# Patient Record
Sex: FEMALE | Race: WHITE | Employment: OTHER | URBAN - METROPOLITAN AREA
[De-identification: names, ages, dates, MRNs, and addresses within clinical notes are randomized per-mention and may not be internally consistent; named-entity substitution may affect disease eponyms.]

---

## 2017-02-01 ENCOUNTER — OFFICE VISIT (OUTPATIENT)
Dept: OPHTHALMOLOGY | Facility: CLINIC | Age: 82
End: 2017-02-01

## 2017-02-01 DIAGNOSIS — H40.1132 PRIMARY OPEN ANGLE GLAUCOMA OF BOTH EYES, MODERATE STAGE: Primary | ICD-10-CM

## 2017-02-01 DIAGNOSIS — H26.8 PSEUDOEXFOLIATION OF LENS CAPSULE: ICD-10-CM

## 2017-02-01 DIAGNOSIS — H25.10 SENILE NUCLEAR SCLEROSIS, UNSPECIFIED LATERALITY: ICD-10-CM

## 2017-02-01 ASSESSMENT — VISUAL ACUITY
OS_CC+: -1
CORRECTION_TYPE: GLASSES
OD_CC: 20/40
OS_BAT_MED: 40+1
METHOD: SNELLEN - LINEAR
OS_CC: 20/30
OD_BAT_MED: 20/50
OD_CC+: -2

## 2017-02-01 ASSESSMENT — TONOMETRY
OS_IOP_MMHG: 20
OD_IOP_MMHG: 20
IOP_METHOD: APPLANATION

## 2017-02-01 ASSESSMENT — CUP TO DISC RATIO
OD_RATIO: 0.6
OS_RATIO: 0.45

## 2017-02-01 ASSESSMENT — REFRACTION_WEARINGRX
OS_CYLINDER: +0.75
OD_AXIS: 175
SPECS_TYPE: SVL
OS_SPHERE: -4.00
OD_CYLINDER: +0.75
OD_SPHERE: -2.25
OS_AXIS: 171

## 2017-02-01 ASSESSMENT — EXTERNAL EXAM - LEFT EYE: OS_EXAM: NORMAL

## 2017-02-01 ASSESSMENT — EXTERNAL EXAM - RIGHT EYE: OD_EXAM: NORMAL

## 2017-02-01 ASSESSMENT — PACHYMETRY
OD_CT(UM): 530
OS_CT(UM): 532

## 2017-02-01 ASSESSMENT — CONF VISUAL FIELD
OS_NORMAL: 1
OD_NORMAL: 1

## 2017-02-01 ASSESSMENT — SLIT LAMP EXAM - LIDS
COMMENTS: NORMAL
COMMENTS: NORMAL

## 2017-02-01 NOTE — NURSING NOTE
Chief Complaints and History of Present Illnesses   Patient presents with     Cataract Follow Up     Stable VA     HPI    Symptoms:     Floaters (Comment: RE , denies flashes of light)   No flashes         Do you have eye pain now?:  No      Comments:  Stable VA  Timolol 1/2% both eyes in A.M.(7:30a.m.)  Latanoprost at night both eyes(8p.m.)  Pushpa Santos COT 10:54 AM February 1, 2017

## 2017-02-01 NOTE — MR AVS SNAPSHOT
After Visit Summary   2/1/2017    Lacey Bah    MRN: [id number]           Patient Information     Date Of Birth          1935        Visit Information        Provider Department      2/1/2017 10:20 AM Brian Hurtado MD 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      Who to contact     Please call your clinic at [phone] to:    Ask questions about your health    Make or cancel appointments    Discuss your medicines    Learn about your test results    Speak to your doctor   If you have compliments or concerns about an experience at your clinic, or if you wish to file a complaint, please contact [facility] Patient Relations at [phone] or email us at [email or web address]         Additional Information About Your Visit        MyChart Information     [employer] gives you secure access to your electronic health record. If you see a primary care provider, you can also send messages to your care team and make appointments. If you have questions, please call your primary care clinic.  If you do not have a primary care provider, please call [phone] and they will assist you.      [employer] is an electronic gateway that provides easy, online access to your medical records. With [employer], you can request a clinic appointment, read your test results, renew a prescription or communicate with your care team.     To access your existing account, please contact your [facility] or call [phone] for assistance.        Care EveryWhere ID      This is your Care EveryWhere ID. This could be used by other organizations to access your [place] medical records  [id number]         Blood Pressure from Last 3 Encounters:   06/27/16 158/88   04/04/16 172/80   03/18/16 142/92    Weight from Last 3 Encounters:   06/27/16 68.947 kg (152 lb)   04/04/16 69.854 kg (154 lb)   03/18/16 70.308 kg (155 lb)              Today, you had the following     No orders found for display       Primary Care Provider Office Phone # Fax #    Karla Hurt [phone]       [facility] [address] [address]        Thank you!     Thank you for choosing [facility]  for your care. Our goal is always to provide you with excellent care. Hearing back from our patients is one way we can continue to improve our services. Please take a few minutes to complete the written survey that you may receive in the mail after your visit with us. Thank you!             Your Updated Medication List - Protect others around you: Learn how to safely use, store and throw away your medicines at www.disposemymeds.org.          This list is accurate as of: 2/1/17 11:41 AM.  Always use your most recent med list.                   Brand Name Dispense Instructions for use    B COMPLEX PO      1 TABLET DAILY       conjugated estrogens cream    PREMARIN    30 g    Place 0.5 g vaginally twice a week       Cranberry Soft 500 MG Chew          fish oil-omega-3 fatty acids 1000 MG capsule      2 TABLET DAILY       hydrochlorothiazide 25 MG tablet    HYDRODIURIL    90 tablet    Take 1 tablet (25 mg) by mouth daily       latanoprost 0.005 % ophthalmic solution    XALATAN    3 Bottle    Place 1 drop into both eyes At Bedtime RE       lisinopril 10 MG tablet    PRINIVIL/ZESTRIL    90 tablet    Take 1 tablet (10 mg) by mouth daily       MULTI-VITAMIN PO      1 TABLET DAILY       phenazopyridine 100 MG tablet    PYRIDIUM    30 tablet    Take 1-2  tablets (100-200 mg) by mouth 3 times daily as needed for urinary tract discomfort       timolol 0.5 % ophthalmic solution    TIMOPTIC    3 Bottle    Place 1 drop into both eyes daily In the morning       VITAMIN C PO      1 TABLET DAILY       vitamin D 1000 UNITS capsule      1 CAPSULE DAILY       VITAMIN E PO      1 TABLET DAILY

## 2017-02-01 NOTE — PROGRESS NOTES
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 or sooner as needed.    I have confirmed the content of the chief complaint, history of present illness, review of systems, and past medical/surgical history sections and edited the information as needed.    Complete documentation of historical and exam elements from today's encounter can  be found in the full encounter summary report (not reduplicated in this progress  note). I personally obtained the chief complaint(s) and history of present illness. I  confirmed and edited as necessary the review of systems, past medical/surgical  history, family history, social history, and examination findings as documented by  others; and I examined the patient myself. I personally reviewed the relevant tests,  images, and reports as documented above. I formulated and edited as necessary the  assessment and plan and discussed the findings and management plan with the  patient and family.    KYLAH Hurtado M.D.

## 2017-05-16 DIAGNOSIS — I10 HYPERTENSION GOAL BP (BLOOD PRESSURE) < 140/90: ICD-10-CM

## 2017-05-17 NOTE — TELEPHONE ENCOUNTER
lisinopril (PRINIVIL,ZESTRIL) 10 MG tablet,  Last Written Prescription Date: 04/04/2016  Last Fill Quantity: 90, # refills: 4  Last Office Visit with [facility] or [facility] prescribing provider: 06/27/2016       Potassium   Date Value Ref Range Status   03/24/2016 4.3 3.4 - 5.3 mmol/L Final     Creatinine   Date Value Ref Range Status   03/24/2016 0.60 0.52 - 1.04 mg/dL Final     BP Readings from Last 3 Encounters:   06/27/16 158/88   04/04/16 172/80   03/18/16 (!) 142/92       hydrochlorothiazide (HYDRODIURIL) 25 MG tablet      Last Written Prescription Date: 04/04/2016  Last Fill Quantity: 90, # refills: 4  Last Office Visit with [facility], [facility] or [facility] prescribing provider: 06/27/2016       Potassium   Date Value Ref Range Status   03/24/2016 4.3 3.4 - 5.3 mmol/L Final     Creatinine   Date Value Ref Range Status   03/24/2016 0.60 0.52 - 1.04 mg/dL Final     BP Readings from Last 3 Encounters:   06/27/16 158/88   04/04/16 172/80   03/18/16 (!) 142/92     Carmen Richmond MA

## 2017-05-18 RX ORDER — HYDROCHLOROTHIAZIDE 25 MG/1
TABLET ORAL
Qty: 90 TABLET | Refills: 0 | Status: SHIPPED | OUTPATIENT
Start: 2017-05-18 | End: 2017-06-14

## 2017-05-18 RX ORDER — LISINOPRIL 10 MG/1
TABLET ORAL
Qty: 90 TABLET | Refills: 0 | Status: SHIPPED | OUTPATIENT
Start: 2017-05-18 | End: 2017-06-14

## 2017-05-18 NOTE — TELEPHONE ENCOUNTER
Routing refill request to provider for review/approval because:  Labs not current:  K+, Cr,   Last BP was elevated      Jono Neves RN

## 2017-06-05 NOTE — PROGRESS NOTES
"  SUBJECTIVE:                                                    Lacey Bah is a 81 year old female who presents to clinic today for the following health issues:      Medication Followup of ***    Taking Medication as prescribed: {.:567856::\"yes\"}    Side Effects:  {NONEORCHOOSE:051473::\"None\"}    Medication Helping Symptoms:  {.:911607::\"yes\"}       {additional problems for provider to add:851036}    Problem list and histories reviewed & adjusted, as indicated.  Additional history: {NONE - AS DOCUMENTED:163289::\"as documented\"}    {HIST REVIEW/ LINKS 2:820647}    Reviewed and updated as needed this visit by clinical staff       Reviewed and updated as needed this visit by Provider         {PROVIDER CHARTING PREFERENCE:969706}    "

## 2017-06-06 ENCOUNTER — OFFICE VISIT (OUTPATIENT)
Dept: OPHTHALMOLOGY | Facility: CLINIC | Age: 82
End: 2017-06-06

## 2017-06-06 DIAGNOSIS — H26.8 PSEUDOEXFOLIATION OF LENS CAPSULE: ICD-10-CM

## 2017-06-06 DIAGNOSIS — H25.10 SENILE NUCLEAR SCLEROSIS, UNSPECIFIED LATERALITY: ICD-10-CM

## 2017-06-06 DIAGNOSIS — H40.1132 PRIMARY OPEN ANGLE GLAUCOMA OF BOTH EYES, MODERATE STAGE: Primary | ICD-10-CM

## 2017-06-06 DIAGNOSIS — H25.049 POSTERIOR SUBCAPSULAR POLAR SENILE CATARACT, UNSPECIFIED LATERALITY: ICD-10-CM

## 2017-06-06 ASSESSMENT — EXTERNAL EXAM - LEFT EYE: OS_EXAM: NORMAL

## 2017-06-06 ASSESSMENT — TONOMETRY
IOP_METHOD: APPLANATION
OS_IOP_MMHG: 19
OD_IOP_MMHG: 19

## 2017-06-06 ASSESSMENT — VISUAL ACUITY
OS_CC: 20/40
METHOD: SNELLEN - LINEAR
OS_CC+: -1
CORRECTION_TYPE: GLASSES
OS_BAT_MED: 20/50
OD_CC: 20/30
OD_CC+: -2
OD_BAT_MED: 20/50

## 2017-06-06 ASSESSMENT — PACHYMETRY
OS_CT(UM): 532
OD_CT(UM): 530

## 2017-06-06 ASSESSMENT — CUP TO DISC RATIO
OS_RATIO: 0.45
OD_RATIO: 0.6

## 2017-06-06 ASSESSMENT — CONF VISUAL FIELD
OS_NORMAL: 1
OD_NORMAL: 1

## 2017-06-06 ASSESSMENT — SLIT LAMP EXAM - LIDS
COMMENTS: NORMAL
COMMENTS: NORMAL

## 2017-06-06 ASSESSMENT — EXTERNAL EXAM - RIGHT EYE: OD_EXAM: NORMAL

## 2017-06-06 NOTE — MR AVS SNAPSHOT
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      Your next 10 appointments already scheduled     Jun 14, 2017 12:30 PM CDT   [facility] Visit with [facility]   [facility]    [address] [place] [id number] [phone]           Bring a current list of meds and any records pertaining to this visit.  For Physicals, please bring immunization records and any forms needing to be filled out.  Please arrive 10 minutes early to complete paperwork.              Who to contact     Please call your clinic at [phone] to:    Ask questions about your health    Make or cancel appointments    Discuss your medicines    Learn about your test results    Speak to your doctor   If you have compliments or concerns about an experience at your clinic, or if you wish to file a complaint, please contact [facility] Patient Relations at [phone] or email us at [email or web address]         Additional Information About Your Visit        MyChart Information     [employer] gives you secure access to your electronic health record. If you see a primary care provider, you can also send messages to your care team and make appointments. If  you have questions, please call your primary care clinic.  If you do not have a primary care provider, please call [phone] and they will assist you.      [employer] is an electronic gateway that provides easy, online access to your medical records. With [employer], you can request a clinic appointment, read your test results, renew a prescription or communicate with your care team.     To access your existing account, please contact your [facility] or call [phone] for assistance.        Care EveryWhere ID     This is your Care EveryWhere ID. This could be used by other organizations to access your [place] medical records  [id number]         Blood Pressure from Last 3 Encounters:   06/27/16 158/88   04/04/16 172/80   03/18/16 (!) 142/92    Weight from Last 3 Encounters:   06/27/16 68.9 kg (152 lb)   04/04/16 69.9 kg (154 lb)   03/18/16 70.3 kg (155 lb)              Today, you had the following     No orders found for display       Primary Care Provider Office Phone # Fax #    Karla Hurt [phone]       [place] [id number] [facility] [address]        Thank you!     Thank you for choosing [facility]  for your care. Our goal is always to provide you with excellent care. Hearing back from our patients is one way we can continue to improve our services. Please take a few minutes to complete the written survey that you may receive in the mail after your visit with us. Thank you!             Your Updated Medication List - Protect others around you: Learn how to safely use, store and throw away your medicines at www.disposemymeds.org.          This list is accurate as of: 6/6/17  7:48 PM.  Always use your most recent med list.                   Brand Name Dispense Instructions for use    B COMPLEX PO      1 TABLET DAILY       conjugated estrogens cream    PREMARIN    30 g    Place 0.5 g vaginally twice a week        Cranberry Soft 500 MG Chew          fish oil-omega-3 fatty acids 1000 MG capsule      2 TABLET DAILY       hydrochlorothiazide 25 MG tablet    HYDRODIURIL    90 tablet    TAKE ONE TABLET BY MOUTH ONCE DAILY       latanoprost 0.005 % ophthalmic solution    XALATAN    3 Bottle    Place 1 drop into both eyes At Bedtime RE       lisinopril 10 MG tablet    PRINIVIL/ZESTRIL    90 tablet    TAKE ONE TABLET BY MOUTH ONCE DAILY       MULTI-VITAMIN PO      1 TABLET DAILY       phenazopyridine 100 MG tablet    PYRIDIUM    30 tablet    Take 1-2 tablets (100-200 mg) by mouth 3 times daily as needed for urinary tract discomfort       timolol 0.5 % ophthalmic solution    TIMOPTIC    3 Bottle    Place 1 drop into both eyes daily In the morning       VITAMIN C PO      1 TABLET DAILY       vitamin D 1000 UNITS capsule      1 CAPSULE DAILY       VITAMIN E PO      1 TABLET DAILY

## 2017-06-06 NOTE — NURSING NOTE
Chief Complaints and History of Present Illnesses   Patient presents with     Glaucoma Follow Up     4 month return     HPI    Affected eye(s):  Both   Symptoms:     No difficulty with reading   No difficulty with driving   No floaters   No flashes   Dryness (Comment: LE)      Duration:  4 months   Frequency:  Constant       Do you have eye pain now?:  No      Comments:  Timolol QAM to BE / LD @ 8:15 am today  Latanoprost QHS to BE / LD @ 10:30 pm last grace  No refills needed per pt.  Winnie Land COT 11:15 AM 06/06/2017

## 2017-06-07 NOTE — PROGRESS NOTES
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 or sooner as needed.    Complete documentation of historical and exam elements from today's encounter can  be found in the full encounter summary report (not reduplicated in this progress  note). I personally obtained the chief complaint(s) and history of present illness. I  confirmed and edited as necessary the review of systems, past medical/surgical  history, family history, social history, and examination findings as documented by  others; and I examined the patient myself. I personally reviewed the relevant tests,  images, and reports as documented above. I formulated and edited as necessary the  assessment and plan and discussed the findings and management plan with the  patient and family.    KYLAH Hurtado M.D

## 2017-06-14 ENCOUNTER — OFFICE VISIT (OUTPATIENT)
Dept: FAMILY MEDICINE | Facility: CLINIC | Age: 82
End: 2017-06-14
Payer: MEDICARE

## 2017-06-14 VITALS
HEIGHT: 64 IN | HEART RATE: 84 BPM | OXYGEN SATURATION: 99 % | BODY MASS INDEX: 26.46 KG/M2 | SYSTOLIC BLOOD PRESSURE: 162 MMHG | DIASTOLIC BLOOD PRESSURE: 90 MMHG | WEIGHT: 155 LBS | TEMPERATURE: 97.3 F

## 2017-06-14 DIAGNOSIS — D05.10 DCIS (DUCTAL CARCINOMA IN SITU) OF BREAST, UNSPECIFIED LATERALITY: ICD-10-CM

## 2017-06-14 DIAGNOSIS — D12.6 TUBULAR ADENOMA OF COLON: ICD-10-CM

## 2017-06-14 DIAGNOSIS — I10 HYPERTENSION GOAL BP (BLOOD PRESSURE) < 140/90: Primary | ICD-10-CM

## 2017-06-14 DIAGNOSIS — I10 ESSENTIAL HYPERTENSION: ICD-10-CM

## 2017-06-14 DIAGNOSIS — N34.2 ATROPHIC URETHRITIS: ICD-10-CM

## 2017-06-14 DIAGNOSIS — R73.01 IMPAIRED FASTING GLUCOSE: ICD-10-CM

## 2017-06-14 DIAGNOSIS — E78.5 HYPERLIPIDEMIA LDL GOAL <130: ICD-10-CM

## 2017-06-14 LAB
ANION GAP SERPL CALCULATED.3IONS-SCNC: 10 MMOL/L (ref 3–14)
BUN SERPL-MCNC: 12 MG/DL (ref 7–30)
CALCIUM SERPL-MCNC: 10.1 MG/DL (ref 8.5–10.1)
CHLORIDE SERPL-SCNC: 101 MMOL/L (ref 94–109)
CHOLEST SERPL-MCNC: 254 MG/DL
CO2 SERPL-SCNC: 28 MMOL/L (ref 20–32)
CREAT SERPL-MCNC: 0.66 MG/DL (ref 0.52–1.04)
GFR SERPL CREATININE-BSD FRML MDRD: 86 ML/MIN/1.7M2
GLUCOSE SERPL-MCNC: 112 MG/DL (ref 70–99)
HBA1C MFR BLD: 5.4 % (ref 4.3–6)
HDLC SERPL-MCNC: 79 MG/DL
LDLC SERPL CALC-MCNC: 148 MG/DL
NONHDLC SERPL-MCNC: 175 MG/DL
POTASSIUM SERPL-SCNC: 4.2 MMOL/L (ref 3.4–5.3)
SODIUM SERPL-SCNC: 139 MMOL/L (ref 133–144)
TRIGL SERPL-MCNC: 135 MG/DL
TSH SERPL DL<=0.005 MIU/L-ACNC: 0.7 MU/L (ref 0.4–4)

## 2017-06-14 PROCEDURE — G0439 PPPS, SUBSEQ VISIT: HCPCS | Performed by: INTERNAL MEDICINE

## 2017-06-14 PROCEDURE — 80048 BASIC METABOLIC PNL TOTAL CA: CPT | Performed by: INTERNAL MEDICINE

## 2017-06-14 PROCEDURE — 80061 LIPID PANEL: CPT | Performed by: INTERNAL MEDICINE

## 2017-06-14 PROCEDURE — 36415 COLL VENOUS BLD VENIPUNCTURE: CPT | Performed by: INTERNAL MEDICINE

## 2017-06-14 PROCEDURE — 84443 ASSAY THYROID STIM HORMONE: CPT | Performed by: INTERNAL MEDICINE

## 2017-06-14 PROCEDURE — 83036 HEMOGLOBIN GLYCOSYLATED A1C: CPT | Performed by: INTERNAL MEDICINE

## 2017-06-14 RX ORDER — HYDROCHLOROTHIAZIDE 25 MG/1
25 TABLET ORAL DAILY
Qty: 90 TABLET | Refills: 3 | Status: SHIPPED | OUTPATIENT
Start: 2017-06-14 | End: 2018-09-11

## 2017-06-14 RX ORDER — LISINOPRIL 10 MG/1
10 TABLET ORAL DAILY
Qty: 90 TABLET | Refills: 3 | Status: SHIPPED | OUTPATIENT
Start: 2017-06-14 | End: 2018-09-11

## 2017-06-14 ASSESSMENT — PAIN SCALES - GENERAL: PAINLEVEL: NO PAIN (0)

## 2017-06-14 NOTE — PATIENT INSTRUCTIONS
Come in to take your blood pressure checked while on medication.  You can get your tetanus shot at the same time if you like.        Preventive Health Recommendations    Female Ages 65 +    Yearly exam:     See your health care provider every year in order to  o Review health changes.   o Discuss preventive care.    o Review your medicines if your doctor has prescribed any.      You no longer need a yearly Pap test unless you've had an abnormal Pap test in the past 10 years. If you have vaginal symptoms, such as bleeding or discharge, be sure to talk with your provider about a Pap test.      Every 1 to 2 years, have a mammogram.  If you are over 69, talk with your health care provider about whether or not you want to continue having screening mammograms.      Every 10 years, have a colonoscopy. Or, have a yearly FIT test (stool test). These exams will check for colon cancer.       Have a cholesterol test every 5 years, or more often if your doctor advises it.       Have a diabetes test (fasting glucose) every three years. If you are at risk for diabetes, you should have this test more often.       At age 65, have a bone density scan (DEXA) to check for osteoporosis (brittle bone disease).    Shots:    Get a flu shot each year.    Get a tetanus shot every 10 years.    Talk to your doctor about your pneumonia vaccines. There are now two you should receive - Pneumovax (PPSV 23) and Prevnar (PCV 13).    Talk to your doctor about the shingles vaccine.    Talk to your doctor about the hepatitis B vaccine.    Nutrition:     Eat at least 5 servings of fruits and vegetables each day.      Eat whole-grain bread, whole-wheat pasta and brown rice instead of white grains and rice.      Talk to your provider about Calcium and Vitamin D.     Lifestyle    Exercise at least 150 minutes a week (30 minutes a day, 5 days a week). This will help you control your weight and prevent disease.      Limit alcohol to one drink per  day.      No smoking.       Wear sunscreen to prevent skin cancer.       See your dentist twice a year for an exam and cleaning.      See your eye doctor every 1 to 2 years to screen for conditions such as glaucoma, macular degeneration and cataracts.  [facility]    If you have any questions regarding to your visit please contact your care team:     Team Pink:   Clinic Hours Telephone Number   Internal Medicine:  Dr. Karla Mueller, NP       7am-7pm  Monday - Thursday   7am-5pm  Fridays  [phone]  (Appointment scheduling available 24/7)    Questions about your visit?  Team Line  [phone]   Urgent Care - [place] and [place] - 11am-9pm Monday-Friday Saturday-Sunday- 9am-5pm   [place] - 5pm-9pm Monday-Friday Saturday-Sunday- 9am-5pm  [phone] - [place]   [phone] - [place]       What options do I have for visits at the clinic other than the traditional office visit?  To expand how we care for you, many of our providers are utilizing electronic visits (e-visits) and telephone visits, when medically appropriate, for interactions with their patients rather than a visit in the clinic.   We also offer nurse visits for many medical concerns. Just like any other service, we will bill your insurance company for this type of visit based on time spent on the phone with your provider. Not all insurance companies cover these visits. Please check with your medical insurance if this type of visit is covered. You will be responsible for any charges that are not paid by your insurance.      E-visits via [employer]:  generally incur a $35.00 fee.  Telephone visits:  Time spent on the phone: *charged based on time that is spent on the phone in increments of 10 minutes. Estimated cost:   5-10 mins $30.00   11-20 mins. $59.00   21-30 mins. $85.00   Use [employer] (secure email communication and access to your chart) to send your primary care provider a  message or make an appointment. Ask someone on your Team how to sign up for [employer].    For a Price Quote for your services, please call our Consumer Price Line at [phone].    As always, Thank you for trusting us with your health care needs!        JC/MA

## 2017-06-14 NOTE — PROGRESS NOTES
INTERNAL MEDICINE  SUBJECTIVE:                                                            Lacey Bah is a 81 year old female who presents for Preventive Visit.  Are you in the first 12 months of your Medicare Part B coverage?  No    Healthy Habits:    Do you get at least three servings of calcium containing foods daily (dairy, green leafy vegetables, etc.)? yes    Amount of exercise or daily activities, outside of work: 2 day(s) per week    Problems taking medications regularly No    Medication side effects: No    Have you had an eye exam in the past two years? yes    Do you see a dentist twice per year? yes    Do you have sleep apnea, excessive snoring or daytime drowsiness?no    COGNITIVE SCREEN- DECLINE BY PATIENT   1) Repeat 3 items (Banana, Sunrise, Chair)  refused  2) Clock draw: refused  3) 3 item recall: refused  Results: refused    Mini-CogTM Copyright S Preston. Licensed by the author for use in [facility] Brand.net; reprinted with permission (arnulfo@[facility]). All rights reserved.        She is checking her BP at home. It was 141/73 this morning, she didn't take her BP medications today.     Taking medications as prescribed without issue.     Following her last colonoscopy in 2012, it was recommended that she could stop colonoscopy at age 65 as polyps were non-cancerous type. Denies bowel changes, blood in her stool, or black tarry stool.    Patient would like to continue mammograms with her DCIS history. Denies any new breast lumps.     Declines pneumonia and shingles vaccines.    Premarin is working well to resolve her vaginal dryness. After first prescribed she used the cream for three months and her dryness was resolved after three months. It comes and goes but is always relieved with the cream. She has not had issues since.    Patient is following with ophthalmology and will likely need cataract surgery in the near future. Next visit is in 4 months.      Reviewed and updated as needed this visit  by clinical staff  Reviewed and updated as needed this visit by Provider  Social History   Substance Use Topics     Smoking status: Never Smoker     Smokeless tobacco: Never Used     Alcohol use No     none    Today's PHQ-2 Score:   PHQ-2 ( 1999 Pfizer) 6/14/2017 6/27/2016   Q1: Little interest or pleasure in doing things 0 0   Q2: Feeling down, depressed or hopeless 0 0   PHQ-2 Score 0 0   Q1: Little interest or pleasure in doing things - -   Q2: Feeling down, depressed or hopeless - -   PHQ-2 Score - -     Do you feel safe in your environment - Yes    Do you have a Health Care Directive?: No: Advance care planning was reviewed with patient; patient declined at this time.    Current providers sharing in care for this patient include:   Patient Care Team:  Karla Hurt MD as PCP - General      Hearing impairment: No    Ability to successfully perform activities of daily living: Yes, no assistance needed     Fall risk:  Fallen 2 or more times in the past year?: No  Any fall with injury in the past year?: No    Home safety:  none identified  JC/MA    The following health maintenance items are reviewed in Epic and correct as of today:  Health Maintenance   Topic Date Due     TETANUS IMMUNIZATION (SYSTEM ASSIGNED)  01/01/2017     MEDICARE ANNUAL WELLNESS VISIT  04/04/2017     FALL RISK ASSESSMENT  04/04/2017     COLONOSCOPY Q5 YR  07/24/2017     INFLUENZA VACCINE (SYSTEM ASSIGNED)  09/01/2017     PNEUMO 5YR BOOST DUE AFTER AGE 65 (NO IB MSG) (2) 10/29/2017     ADVANCE DIRECTIVE PLANNING Q5 YRS  10/31/2017     DEXA Q3 YR  02/11/2018     PNEUMOCOCCAL (2 of 2 - PPSV23) 06/14/2018     Pneumonia Vaccine:Adults age 65+ who received Pneumovax (PPSV23) at 65 years or older: Should be given PCV13 > 1 year after their most recent PPSV23  Mammogram Screening: Patient over age 75, has elected to continue with mammography screening.  History of abnormal Pap smear: NO - age 65 - see link Cervical Cytology Screening  "Guidelines     ROS:   ROS: 10 point ROS neg other than the symptoms noted above in the HPI.    This document serves as a record of the services and decisions personally performed and made by Karla Hurt MD. It was created on his/her behalf by Holli Fritz, trained medical scribe. The creation of this document is based the provider's statements to the medical scribes.    Scribe Holli Fritz 12:45 PM, June 14, 2017    Problem list, Medication list, Allergies, and Medical/Social/Surgical histories reviewed in [facility] and updated as appropriate.  Labs reviewed in EPIC  OBJECTIVE:                                                            /90  Pulse 84  Temp 97.3  F (36.3  C) (Oral)  Ht 1.626 m (5' 4\")  Wt 70.3 kg (155 lb)  SpO2 99%  Breastfeeding? No  BMI 26.61 kg/m2 Estimated body mass index is 26.61 kg/(m^2) as calculated from the following:    Height as of this encounter: 1.626 m (5' 4\").    Weight as of this encounter: 70.3 kg (155 lb).  EXAM:   GENERAL APPEARANCE: healthy, alert and no distress  EYES: Eyes grossly normal to inspection, PERRL and conjunctivae and sclerae normal  HENT: ear canals and TM's normal, nose and mouth without ulcers or lesions, oropharynx clear and oral mucous membranes moist  NECK: no adenopathy, no asymmetry, masses, or scars and thyroid normal to palpation  RESP: lungs clear to auscultation - no rales, rhonchi or wheezes  BREAST: normal without masses, tenderness or nipple discharge and no palpable axillary masses or adenopathy, multiple sev at the top,   CV: regular rate and rhythm, normal S1 S2, no S3 or S4, no murmur, click or rub, no peripheral edema and peripheral pulses strong  ABDOMEN: soft, nontender, no hepatosplenomegaly, no masses and bowel sounds normal   (female): normal female external genitalia, normal urethral meatus, vaginal mucosal atrophy noted  MS: no musculoskeletal defects are noted and gait is age appropriate without ataxia  SKIN: no suspicious " lesions or rashe multiple veborrheic keratosis on trunk   NEURO: Normal strength and tone, sensory exam grossly normal, mentation intact and speech normal  PSYCH: mentation appears normal and affect normal/bright    ASSESSMENT / PLAN:                                                            1. Hypertension goal BP (blood pressure) < 140/90  Elevated. Pt did not take her BP medication this morning. She will return to pharmacy for a BP check while on her medication. Continues on HCTZ 25 MG and lisinopril 10 MG. Known white coat hypertension   - hydrochlorothiazide (HYDRODIURIL) 25 MG tablet; Take 1 tablet (25 mg) by mouth daily  Dispense: 90 tablet; Refill: 0  - lisinopril (PRINIVIL/ZESTRIL) 10 MG tablet; Take 1 tablet (10 mg) by mouth daily  Dispense: 90 tablet; Refill: 0    2. DCIS (ductal carcinoma in situ) of breast, unspecified laterality  Continues with yearly mammograms.    3. Impaired fasting glucose  Stable. A1C was 5.4 today.    Lab Results   Component Value Date    A1C 5.4 06/14/2017    A1C 5.7 02/06/2015    A1C 5.3 03/02/2012       4. Hyperlipidemia LDL goal <130  Stable.     5. Tubular adenoma of colon  Discussed that it is ok for pt to stop colonoscopies as there is more risk than benefit at her age.    6. Atrophic urethritis  Controlled. The current medical regimen is effective;  continue present plan and medications.  - conjugated estrogens (PREMARIN) cream; Place 0.5 g vaginally twice a week  Dispense: 30 g; Refill: 12      End of Life Planning:  Patient currently has an advanced directive: No.  I have verified the patient's ablity to prepare an advanced directive/make health care decisions.  Literature was provided to assist patient in preparing an advanced directive.    COUNSELING:  Reviewed preventive health counseling, as reflected in patient instructions       Regular exercise       Healthy diet/nutrition       Immunizations  Declined: Pneumococcal and Zoster       Estimated body mass index is  "26.61 kg/(m^2) as calculated from the following:    Height as of this encounter: 1.626 m (5' 4\").    Weight as of this encounter: 70.3 kg (155 lb).   reports that she has never smoked. She has never used smokeless tobacco.    Appropriate preventive services were discussed with this patient, including applicable screening as appropriate for cardiovascular disease, diabetes, osteopenia/osteoporosis, and glaucoma.  As appropriate for age/gender, discussed screening for colorectal cancer, prostate cancer, breast cancer, and cervical cancer. Checklist reviewing preventive services available has been given to the patient.    Reviewed patients plan of care and provided an AVS. The Basic Care Plan (routine screening as documented in Health Maintenance) for Lacey meets the Care Plan requirement. This Care Plan has been established and reviewed with the Patient.    Counseling Resources:  ATP IV Guidelines  Pooled Cohorts Equation Calculator  Breast Cancer Risk Calculator  FRAX Risk Assessment  ICSI Preventive Guidelines  Dietary Guidelines for Americans, 2010  Viyet's MyPlate  ASA Prophylaxis  Lung CA Screening    Patient Instructions     Come in to take your blood pressure checked while on medication.  You can get your tetanus shot at the same time if you like.        Preventive Health Recommendations    Female Ages 65 +    Yearly exam:     See your health care provider every year in order to  o Review health changes.   o Discuss preventive care.    o Review your medicines if your doctor has prescribed any.      You no longer need a yearly Pap test unless you've had an abnormal Pap test in the past 10 years. If you have vaginal symptoms, such as bleeding or discharge, be sure to talk with your provider about a Pap test.      Every 1 to 2 years, have a mammogram.  If you are over 69, talk with your health care provider about whether or not you want to continue having screening mammograms.      Every 10 years, have a " colonoscopy. Or, have a yearly FIT test (stool test). These exams will check for colon cancer.       Have a cholesterol test every 5 years, or more often if your doctor advises it.       Have a diabetes test (fasting glucose) every three years. If you are at risk for diabetes, you should have this test more often.       At age 65, have a bone density scan (DEXA) to check for osteoporosis (brittle bone disease).    Shots:    Get a flu shot each year.    Get a tetanus shot every 10 years.    Talk to your doctor about your pneumonia vaccines. There are now two you should receive - Pneumovax (PPSV 23) and Prevnar (PCV 13).    Talk to your doctor about the shingles vaccine.    Talk to your doctor about the hepatitis B vaccine.    Nutrition:     Eat at least 5 servings of fruits and vegetables each day.      Eat whole-grain bread, whole-wheat pasta and brown rice instead of white grains and rice.      Talk to your provider about Calcium and Vitamin D.     Lifestyle    Exercise at least 150 minutes a week (30 minutes a day, 5 days a week). This will help you control your weight and prevent disease.      Limit alcohol to one drink per day.      No smoking.       Wear sunscreen to prevent skin cancer.       See your dentist twice a year for an exam and cleaning.      See your eye doctor every 1 to 2 years to screen for conditions such as glaucoma, macular degeneration and cataracts.  [facility]    If you have any questions regarding to your visit please contact your care team:     Team Pink:   Clinic Hours Telephone Number   Internal Medicine:  Dr. Karla Mueller NP       7am-7pm  Monday - Thursday   7am-5pm  Fridays  [phone]  (Appointment scheduling available 24/7)    Questions about your visit?  Team Line  [phone]   Urgent Care - [place] - 11am-9pm Monday-Friday Saturday-Sunday- 9am-5pm   [place] - 5pm-9pm Monday-Friday  Saturday-Sunday- 9am-5pm  [phone] - [place]   [phone] - [place]       What options do I have for visits at the clinic other than the traditional office visit?  To expand how we care for you, many of our providers are utilizing electronic visits (e-visits) and telephone visits, when medically appropriate, for interactions with their patients rather than a visit in the clinic.   We also offer nurse visits for many medical concerns. Just like any other service, we will bill your insurance company for this type of visit based on time spent on the phone with your provider. Not all insurance companies cover these visits. Please check with your medical insurance if this type of visit is covered. You will be responsible for any charges that are not paid by your insurance.      E-visits via IntroNet:  generally incur a $35.00 fee.  Telephone visits:  Time spent on the phone: *charged based on time that is spent on the phone in increments of 10 minutes. Estimated cost:   5-10 mins $30.00   11-20 mins. $59.00   21-30 mins. $85.00   Use IntroNet (secure email communication and access to your chart) to send your primary care provider a message or make an appointment. Ask someone on your Team how to sign up for IntroNet.    For a Price Quote for your services, please call our Consumer Price Line at [phone].    As always, Thank you for trusting us with your health care needs!        JC/MA          I spent 19 minutes of time with the patient and >50% of it was in education and counseling regarding preventive health.     The information in this document, created by the medical scribe for me, accurately reflects the services I personally performed and the decisions made by me. I have reviewed and approved this document for accuracy prior to leaving the patient care area.  Karla Hurt MD  12:45 PM, 06/14/17    Karla Hurt MD  [facility]    Start 12:41 PM  End 1:00 PM

## 2017-06-14 NOTE — NURSING NOTE
"Chief Complaint   Patient presents with     Recheck Medication       Initial /90  Pulse 84  Temp 97.3  F (36.3  C) (Oral)  Ht 5' 4\" (1.626 m)  Wt 155 lb (70.3 kg)  SpO2 99%  Breastfeeding? No  BMI 26.61 kg/m2 Estimated body mass index is 26.61 kg/(m^2) as calculated from the following:    Height as of this encounter: 5' 4\" (1.626 m).    Weight as of this encounter: 155 lb (70.3 kg).  Medication Reconciliation: complete   JC/MA      "

## 2017-06-14 NOTE — MR AVS SNAPSHOT
After Visit Summary   6/14/2017    Lacey Bah    MRN: [id number]           Patient Information     Date Of Birth          1935        Visit Information        Provider Department      6/14/2017 12:30 PM Karla Hurt MD [facility]        Today's Diagnoses     Hypertension goal BP (blood pressure) < 140/90    -  1    DCIS (ductal carcinoma in situ) of breast, unspecified laterality        Essential hypertension        Impaired fasting glucose        Hyperlipidemia LDL goal <130        Tubular adenoma of colon        Atrophic urethritis          Care Instructions    -Come in to take your blood pressure checked while on medication.  You can get your tetanus shot at the same time if you like.        Preventive Health Recommendations    Female Ages 65 +    Yearly exam:     See your health care provider every year in order to  o Review health changes.   o Discuss preventive care.    o Review your medicines if your doctor has prescribed any.      You no longer need a yearly Pap test unless you've had an abnormal Pap test in the past 10 years. If you have vaginal symptoms, such as bleeding or discharge, be sure to talk with your provider about a Pap test.      Every 1 to 2 years, have a mammogram.  If you are over 69, talk with your health care provider about whether or not you want to continue having screening mammograms.      Every 10 years, have a colonoscopy. Or, have a yearly FIT test (stool test). These exams will check for colon cancer.       Have a cholesterol test every 5 years, or more often if your doctor advises it.       Have a diabetes test (fasting glucose) every three years. If you are at risk for diabetes, you should have this test more often.       At age 65, have a bone density scan (DEXA) to check for osteoporosis (brittle bone disease).    Shots:    Get a flu shot each year.    Get a tetanus shot every 10 years.    Talk to your doctor about your pneumonia  vaccines. There are now two you should receive - Pneumovax (PPSV 23) and Prevnar (PCV 13).    Talk to your doctor about the shingles vaccine.    Talk to your doctor about the hepatitis B vaccine.    Nutrition:     Eat at least 5 servings of fruits and vegetables each day.      Eat whole-grain bread, whole-wheat pasta and brown rice instead of white grains and rice.      Talk to your provider about Calcium and Vitamin D.     Lifestyle    Exercise at least 150 minutes a week (30 minutes a day, 5 days a week). This will help you control your weight and prevent disease.      Limit alcohol to one drink per day.      No smoking.       Wear sunscreen to prevent skin cancer.       See your dentist twice a year for an exam and cleaning.      See your eye doctor every 1 to 2 years to screen for conditions such as glaucoma, macular degeneration and cataracts.  [facility]    If you have any questions regarding to your visit please contact your care team:     Team Pink:   Clinic Hours Telephone Number   Internal Medicine:  Dr. Karla Mueller NP       7am-7pm  Monday - Thursday   7am-5pm  Fridays  [phone]  (Appointment scheduling available 24/7)    Questions about your visit?  Team Line  [phone]   Urgent Care - [place] - 11am-9pm Monday-Friday Saturday-Sunday- 9am-5pm   [place] - 5pm-9pm Monday-Friday Saturday-Sunday- 9am-5pm  [phone] - [place]   [phone] - [place]       What options do I have for visits at the clinic other than the traditional office visit?  To expand how we care for you, many of our providers are utilizing electronic visits (e-visits) and telephone visits, when medically appropriate, for interactions with their patients rather than a visit in the clinic.   We also offer nurse visits for many medical concerns. Just like any other service, we will bill your insurance company for this type of visit based on  time spent on the phone with your provider. Not all insurance companies cover these visits. Please check with your medical insurance if this type of visit is covered. You will be responsible for any charges that are not paid by your insurance.      E-visits via Tealethart:  generally incur a $35.00 fee.  Telephone visits:  Time spent on the phone: *charged based on time that is spent on the phone in increments of 10 minutes. Estimated cost:   5-10 mins $30.00   11-20 mins. $59.00   21-30 mins. $85.00   Use [employer] (secure email communication and access to your chart) to send your primary care provider a message or make an appointment. Ask someone on your Team how to sign up for [employer].    For a Price Quote for your services, please call our 8020select Line at [phone].    As always, Thank you for trusting us with your health care needs!        JC/MA              Follow-ups after your visit        Who to contact     If you have questions or need follow up information about today's clinic visit or your schedule please contact [facility] directly at [phone].  Normal or non-critical lab and imaging results will be communicated to you by Tealethart, letter or phone within 4 business days after the clinic has received the results. If you do not hear from us within 7 days, please contact the clinic through SynapticMasht or phone. If you have a critical or abnormal lab result, we will notify you by phone as soon as possible.  Submit refill requests through [employer] or call your pharmacy and they will forward the refill request to us. Please allow 3 business days for your refill to be completed.          Additional Information About Your Visit        Tealethart Information     [employer] gives you secure access to your electronic health record. If you see a primary care provider, you can also send messages to your care team and make appointments. If you have questions, please call your primary care clinic.  " If you do not have a primary care provider, please call [phone] and they will assist you.        Care EveryWhere ID     This is your Care EveryWhere ID. This could be used by other organizations to access your [place] medical records  [id number]        Your Vitals Were     Pulse Temperature Height Pulse Oximetry Breastfeeding? BMI (Body Mass Index)    84 97.3  F (36.3  C) (Oral) 5' 4\" (1.626 m) 99% No 26.61 kg/m2       Blood Pressure from Last 3 Encounters:   06/14/17 162/90   06/27/16 158/88   04/04/16 172/80    Weight from Last 3 Encounters:   06/14/17 155 lb (70.3 kg)   06/27/16 152 lb (68.9 kg)   04/04/16 154 lb (69.9 kg)              We Performed the Following     Basic metabolic panel     Hemoglobin A1c     Lipid panel reflex to direct LDL     TSH with free T4 reflex          Today's Medication Changes          These changes are accurate as of: 6/14/17  1:00 PM.  If you have any questions, ask your nurse or doctor.               These medicines have changed or have updated prescriptions.        Dose/Directions    hydrochlorothiazide 25 MG tablet   Commonly known as:  HYDRODIURIL   This may have changed:  See the new instructions.   Used for:  Hypertension goal BP (blood pressure) < 140/90   Changed by:  Karla Hurt MD        Dose:  25 mg   Take 1 tablet (25 mg) by mouth daily   Quantity:  90 tablet   Refills:  3       lisinopril 10 MG tablet   Commonly known as:  PRINIVIL/ZESTRIL   This may have changed:  See the new instructions.   Used for:  Hypertension goal BP (blood pressure) < 140/90   Changed by:  Karla Hurt MD        Dose:  10 mg   Take 1 tablet (10 mg) by mouth daily   Quantity:  90 tablet   Refills:  3            Where to get your medicines      These medications were sent to [facility] [address] [place], [place] - [address] [facility]  [address] [facility] [address]     Phone:  [phone]     hydrochlorothiazide 25 MG tablet    lisinopril 10 MG tablet       "          Primary Care Provider Office Phone # Fax #    Karla Hurt [phone]       [address]  [place] [address]        Thank you!     Thank you for choosing [facility]  for your care. Our goal is always to provide you with excellent care. Hearing back from our patients is one way we can continue to improve our services. Please take a few minutes to complete the written survey that you may receive in the mail after your visit with us. Thank you!             Your Updated Medication List - Protect others around you: Learn how to safely use, store and throw away your medicines at www.disposemymeds.org.          This list is accurate as of: 6/14/17  1:00 PM.  Always use your most recent med list.                   Brand Name Dispense Instructions for use    B COMPLEX PO      1 TABLET DAILY       conjugated estrogens cream    PREMARIN    30 g    Place 0.5 g vaginally twice a week       Cranberry Soft 500 MG Chew          fish oil-omega-3 fatty acids 1000 MG capsule      2 TABLET DAILY       hydrochlorothiazide 25 MG tablet    HYDRODIURIL    90 tablet    Take 1 tablet (25 mg) by mouth daily       latanoprost 0.005 % ophthalmic solution    XALATAN    3 Bottle    Place 1 drop into both eyes At Bedtime RE       lisinopril 10 MG tablet    PRINIVIL/ZESTRIL    90 tablet    Take 1 tablet (10 mg) by mouth daily       MULTI-VITAMIN PO      1 TABLET DAILY       phenazopyridine 100 MG tablet    PYRIDIUM    30 tablet    Take 1-2 tablets (100-200 mg) by mouth 3 times daily as needed for urinary tract discomfort       timolol 0.5 % ophthalmic solution    TIMOPTIC    3 Bottle    Place 1 drop into both eyes daily In the morning       VITAMIN C PO      1 TABLET DAILY       vitamin D 1000 UNITS capsule      1 CAPSULE DAILY       VITAMIN E PO      1 TABLET DAILY

## 2017-06-15 NOTE — PROGRESS NOTES
Cholesterol has increased.  Normal electrolytes. Normal kidney function. Blood sugar is slightly elevated but not in the range of diabetes.  Normal thyroid.

## 2017-06-28 ENCOUNTER — TRANSFERRED RECORDS (OUTPATIENT)
Dept: HEALTH INFORMATION MANAGEMENT | Facility: CLINIC | Age: 82
End: 2017-06-28

## 2017-07-25 ENCOUNTER — TELEPHONE (OUTPATIENT)
Dept: INTERNAL MEDICINE | Facility: CLINIC | Age: 82
End: 2017-07-25

## 2017-07-25 NOTE — TELEPHONE ENCOUNTER
Patient last seen on 6/14/17.  JC/MA    Panel Management Review      Patient has the following on her problem list: None      Composite cancer screening  Chart review shows that this patient is due/due soon for the following None  Summary:    Patient is due/failing the following:   none    Action needed:   none    Type of outreach:    none    Questions for provider review:    None                                                                                                                                    NINA       Chart routed to.

## 2017-10-01 ENCOUNTER — HEALTH MAINTENANCE LETTER (OUTPATIENT)
Age: 82
End: 2017-10-01

## 2017-10-27 ENCOUNTER — OFFICE VISIT (OUTPATIENT)
Dept: OPHTHALMOLOGY | Facility: CLINIC | Age: 82
End: 2017-10-27

## 2017-10-27 DIAGNOSIS — H26.8 PSEUDOEXFOLIATION OF LENS CAPSULE: ICD-10-CM

## 2017-10-27 DIAGNOSIS — H25.049 POSTERIOR SUBCAPSULAR POLAR SENILE CATARACT, UNSPECIFIED LATERALITY: ICD-10-CM

## 2017-10-27 DIAGNOSIS — H40.1132 PRIMARY OPEN ANGLE GLAUCOMA OF BOTH EYES, MODERATE STAGE: Primary | ICD-10-CM

## 2017-10-27 DIAGNOSIS — H25.10 SENILE NUCLEAR SCLEROSIS, UNSPECIFIED LATERALITY: ICD-10-CM

## 2017-10-27 ASSESSMENT — SLIT LAMP EXAM - LIDS
COMMENTS: NORMAL
COMMENTS: NORMAL

## 2017-10-27 ASSESSMENT — CUP TO DISC RATIO
OS_RATIO: 0.45
OD_RATIO: 0.55

## 2017-10-27 ASSESSMENT — EXTERNAL EXAM - RIGHT EYE: OD_EXAM: NORMAL

## 2017-10-27 ASSESSMENT — TONOMETRY
OD_IOP_MMHG: 21
IOP_METHOD: APPLANATION
OS_IOP_MMHG: 20

## 2017-10-27 ASSESSMENT — VISUAL ACUITY
OS_CC+: -2
OS_CC: 20/40
OD_CC+: -2
METHOD: SNELLEN - LINEAR
OD_CC: 20/30
OD_BAT_MED: 20/40-3
CORRECTION_TYPE: GLASSES

## 2017-10-27 ASSESSMENT — CONF VISUAL FIELD
OD_NORMAL: 1
OS_NORMAL: 1

## 2017-10-27 ASSESSMENT — REFRACTION_MANIFEST
OS_AXIS: 171
OS_CYLINDER: +0.75
OD_SPHERE: -2.25
OD_AXIS: 175
OS_SPHERE: -4.00
OD_CYLINDER: +1.00

## 2017-10-27 ASSESSMENT — REFRACTION_WEARINGRX
OD_AXIS: 175
OS_CYLINDER: +0.75
OD_CYLINDER: +0.75
OS_SPHERE: -4.00
OS_AXIS: 171
OD_SPHERE: -2.25

## 2017-10-27 ASSESSMENT — PACHYMETRY
OS_CT(UM): 532
OD_CT(UM): 530

## 2017-10-27 ASSESSMENT — EXTERNAL EXAM - LEFT EYE: OS_EXAM: NORMAL

## 2017-10-27 NOTE — NURSING NOTE
Chief Complaints and History of Present Illnesses   Patient presents with     Glaucoma Follow Up     HPI    Last Eye Exam:  6/6/17   Affected eye(s):  Both   Symptoms:        Duration:  4 months   Frequency:  Constant       Do you have eye pain now?:  No      Comments:  Pt here for 4 month follow up. Vision is slightly changed? Would like to review options with surgery and MD today. No problems using current drops and last drop used this morning. BE feeling good and comfortable. PANCHITO SNOW, COA 10:25 AM 10/27/2017

## 2017-10-27 NOTE — MR AVS SNAPSHOT
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      Your next 10 appointments already scheduled     Feb 28, 2018 10:20 AM CST   Return Visit with Brian Hurtado MD   [facility])    [facility]  [address] [address] [phone]              Who to contact     Please call your clinic at [phone] to:    Ask questions about your health    Make or cancel appointments    Discuss your medicines    Learn about your test results    Speak to your doctor   If you have compliments or concerns about an experience at your clinic, or if you wish to file a complaint, please contact [facility] Patient Relations at [phone] or email us at [email or web address]         Additional Information About Your Visit        MyChart Information     Gremlnt gives you secure access to your electronic health record. If you see a primary care provider, you can also send messages to your care team and make appointments. If you have questions, please call your primary care clinic.  If you do not have a primary care provider, please call [phone] and they will assist you.       [employer] Solar is an electronic gateway that provides easy, online access to your medical records. With [employer], you can request a clinic appointment, read your test results, renew a prescription or communicate with your care team.     To access your existing account, please contact your [facility] or call [phone] for assistance.        Care EveryWhere ID     This is your Care EveryWhere ID. This could be used by other organizations to access your [place] medical records  [id number]         Blood Pressure from Last 3 Encounters:   06/14/17 162/90   06/27/16 158/88   04/04/16 172/80    Weight from Last 3 Encounters:   06/14/17 70.3 kg (155 lb)   06/27/16 68.9 kg (152 lb)   04/04/16 69.9 kg (154 lb)              We Performed the Following     OCT Optic Nerve RNFL Spectralis OU (both eyes)        Primary Care Provider Office Phone # Fax #    Karla Hurt [phone]       [address] [facility] [address]        Equal Access to Services     [facility]: Hadii aad ku hadasho Soomaali, waaxda luqadaha, qaybta kaalmada adeegyada, waxay deandrein hayrema shields . So [facility] [phone].    ATENCIÓN: Si habla español, tiene a pena disposición servicios gratuitos de asistencia lingüística. Llame al [phone].    We comply with applicable federal civil rights laws and [place] laws. We do not discriminate on the basis of race, color, national origin, age, disability, sex, sexual orientation, or gender identity.            Thank you!     Thank you for choosing [facility]  for your care. Our goal is always to provide you with excellent care. Hearing back from our patients is one way we can continue to improve our services. Please take a few minutes to complete the written survey that you may receive in the mail after your visit with us. Thank you!             Your Updated Medication List - Protect others around you: Learn how  to safely use, store and throw away your medicines at www.disposemymeds.org.          This list is accurate as of: 10/27/17  9:30 PM.  Always use your most recent med list.                   Brand Name Dispense Instructions for use Diagnosis    B COMPLEX PO      1 TABLET DAILY        conjugated estrogens cream    PREMARIN    30 g    Place 0.5 g vaginally twice a week    Atrophic urethritis       Cranberry Soft 500 MG Chew           fish oil-omega-3 fatty acids 1000 MG capsule      2 TABLET DAILY        hydrochlorothiazide 25 MG tablet    HYDRODIURIL    90 tablet    Take 1 tablet (25 mg) by mouth daily    Hypertension goal BP (blood pressure) < 140/90       latanoprost 0.005 % ophthalmic solution    XALATAN    3 Bottle    Place 1 drop into both eyes At Bedtime RE    Primary open angle glaucoma of both eyes, moderate stage       lisinopril 10 MG tablet    PRINIVIL/ZESTRIL    90 tablet    Take 1 tablet (10 mg) by mouth daily    Hypertension goal BP (blood pressure) < 140/90       MULTI-VITAMIN PO      1 TABLET DAILY        phenazopyridine 100 MG tablet    PYRIDIUM    30 tablet    Take 1-2 tablets (100-200 mg) by mouth 3 times daily as needed for urinary tract discomfort    Abnormal urinalysis, Atrophic urethritis       timolol 0.5 % ophthalmic solution    TIMOPTIC    3 Bottle    Place 1 drop into both eyes daily In the morning    Primary open angle glaucoma of both eyes, moderate stage       VITAMIN C PO      1 TABLET DAILY        vitamin D 1000 UNITS capsule      1 CAPSULE DAILY        VITAMIN E PO      1 TABLET DAILY

## 2017-10-28 NOTE — PROGRESS NOTES
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 or sooner as needed.    Complete documentation of historical and exam elements from today's encounter can  be found in the full encounter summary report (not reduplicated in this progress  note). I personally obtained the chief complaint(s) and history of present illness. I  confirmed and edited as necessary the review of systems, past medical/surgical  history, family history, social history, and examination findings as documented by  others; and I examined the patient myself. I personally reviewed the relevant tests,  images, and reports as documented above. I formulated and edited as necessary the  assessment and plan and discussed the findings and management plan with the  patient and family.    KYLAH Hurtado M.D

## 2017-11-28 ENCOUNTER — OFFICE VISIT (OUTPATIENT)
Dept: URGENT CARE | Facility: RETAIL CLINIC | Age: 82
End: 2017-11-28

## 2017-11-28 VITALS — DIASTOLIC BLOOD PRESSURE: 80 MMHG | HEART RATE: 85 BPM | TEMPERATURE: 98.4 F | SYSTOLIC BLOOD PRESSURE: 174 MMHG

## 2017-11-28 DIAGNOSIS — R21 RASH OF FACE: Primary | ICD-10-CM

## 2017-11-28 NOTE — NURSING NOTE
"Chief Complaint   Patient presents with     Derm Problem     rash on forehead x 2 days, rash itches sometimes, at the beginning it hurt at that time but not much pain now, mostly burning sensation, no fevers       Initial /80 (BP Location: Left arm)  Pulse 85  Temp 98.4  F (36.9  C) (Temporal) Estimated body mass index is 26.61 kg/(m^2) as calculated from the following:    Height as of 6/14/17: 5' 4\" (1.626 m).    Weight as of 6/14/17: 155 lb (70.3 kg).  Medication Reconciliation: complete    "

## 2017-11-28 NOTE — MR AVS SNAPSHOT
After Visit Summary   11/28/2017    Lacey Bah    MRN: [id number]           Patient Information     Date Of Birth          1935        Visit Information        Provider Department      11/28/2017 12:30 PM Nuria Coto NP [facility]        Today's Diagnoses     Rash of face    -  1      Care Instructions    Referred to urgent care this afternoon.          Follow-ups after your visit        Your next 10 appointments already scheduled     Feb 28, 2018 10:20 AM CST   Return Visit with Brian Hurtado MD   [facility])    [facility]  [address]  [facility] [address] [phone]              Who to contact     You can reach your care team any time of the day by calling [phone].  Notification of test results:  If you have an abnormal lab result, we will notify you by phone as soon as possible.         Additional Information About Your Visit        MyChart Information     LoopUp gives you secure access to your electronic health record. If you see a primary care provider, you can also send messages to your care team and make appointments. If you have questions, please call your primary care clinic.  If you do not have a primary care provider, please call [phone] and they will assist you.        Care EveryWhere ID     This is your Care EveryWhere ID. This could be used by other organizations to access your [place] medical records  [id number]        Your Vitals Were     Pulse Temperature                85 98.4  F (36.9  C) (Temporal)           Blood Pressure from Last 3 Encounters:   11/28/17 174/80   06/14/17 162/90   06/27/16 158/88    Weight from Last 3 Encounters:   06/14/17 155 lb (70.3 kg)   06/27/16 152 lb (68.9 kg)   04/04/16 154 lb (69.9 kg)              Today, you had the following     No orders found for display       Primary Care Provider Office  Phone # Fax #    Karla Meli Hurt [phone]       [address] [facility]  [place] [address]        Equal Access to Services     YONATHAN HOANGSUSHIL : Bella imer cortes rajendra Ceja, wanaada luqadaha, qamemota kadamion vergara, marty alex laDezrema callie. So [facility] [phone].    ATENCIÓN: Si habla español, tiene a pena disposición servicios gratuitos de asistencia lingüística. Llame al [phone].    We comply with applicable federal civil rights laws and [place] laws. We do not discriminate on the basis of race, color, national origin, age, disability, sex, sexual orientation, or gender identity.            Thank you!     Thank you for choosing [facility]  for your care. Our goal is always to provide you with excellent care. Hearing back from our patients is one way we can continue to improve our services. Please take a few minutes to complete the written survey that you may receive in the mail after your visit with us. Thank you!             Your Updated Medication List - Protect others around you: Learn how to safely use, store and throw away your medicines at www.disposemymeds.org.          This list is accurate as of: 11/28/17 12:55 PM.  Always use your most recent med list.                   Brand Name Dispense Instructions for use Diagnosis    B COMPLEX PO      1 TABLET DAILY        conjugated estrogens cream    PREMARIN    30 g    Place 0.5 g vaginally twice a week    Atrophic urethritis       Cranberry Soft 500 MG Chew           fish oil-omega-3 fatty acids 1000 MG capsule      2 TABLET DAILY        hydrochlorothiazide 25 MG tablet    HYDRODIURIL    90 tablet    Take 1 tablet (25 mg) by mouth daily    Hypertension goal BP (blood pressure) < 140/90       latanoprost 0.005 % ophthalmic solution    XALATAN    3 Bottle    Place 1 drop into both eyes At Bedtime RE    Primary open angle glaucoma of both eyes, moderate stage       lisinopril 10 MG tablet     PRINIVIL/ZESTRIL    90 tablet    Take 1 tablet (10 mg) by mouth daily    Hypertension goal BP (blood pressure) < 140/90       MULTI-VITAMIN PO      1 TABLET DAILY        phenazopyridine 100 MG tablet    PYRIDIUM    30 tablet    Take 1-2 tablets (100-200 mg) by mouth 3 times daily as needed for urinary tract discomfort    Abnormal urinalysis, Atrophic urethritis       timolol 0.5 % ophthalmic solution    TIMOPTIC    3 Bottle    Place 1 drop into both eyes daily In the morning    Primary open angle glaucoma of both eyes, moderate stage       VITAMIN C PO      1 TABLET DAILY        vitamin D 1000 UNITS capsule      1 CAPSULE DAILY        VITAMIN E PO      1 TABLET DAILY

## 2017-11-28 NOTE — PROGRESS NOTES
Chief Complaint   Patient presents with     Derm Problem     rash on forehead x 2 days, rash itches sometimes, at the beginning it hurt at that time but not much pain now, mostly burning sensation, no fevers     SUBJECTIVE:  Lacey Bah is a 82 year old female who presents to the clinic today  for a rash. Vesicular rash on right side of forehead and right eyelid.  Onset of rash was 2 day(s) ago.   Rash is sudden onset.   Location of the rash: forehead  Quality/symptoms of rash: painful before it errupted, now burning sensation  Associated symptoms include: nothing.  Symptoms are moderate and rash seems to be worsening.  Previous history of a similar rash? No  Treatment measures tried include:  none      Past Medical History:   Diagnosis Date     DCIS (ductal carcinoma in situ) of breast 2007    s/p lumpectomy , radiation - needs yearly us and twice yearly office exams per oncologist Dr. Diehl     Glaucoma     right eye      HTN (hypertension)     beta blockers made her fatigued, white coat htn     Nonsenile cataract      Thyroid nodule     saw Stesin 9/08-us due in 9/09 by him     Tubular adenoma of colon 10/31/2012     Uterine disorder     thickened lining - biopsy 2008 negative     Current Outpatient Prescriptions   Medication Sig Dispense Refill     hydrochlorothiazide (HYDRODIURIL) 25 MG tablet Take 1 tablet (25 mg) by mouth daily 90 tablet 3     lisinopril (PRINIVIL/ZESTRIL) 10 MG tablet Take 1 tablet (10 mg) by mouth daily 90 tablet 3     timolol (TIMOPTIC) 0.5 % ophthalmic solution Place 1 drop into both eyes daily In the morning 3 Bottle 3     latanoprost (XALATAN) 0.005 % ophthalmic solution Place 1 drop into both eyes At Bedtime RE 3 Bottle 3     VITAMIN D 1000 UNIT OR CAPS 1 CAPSULE DAILY       MULTI-VITAMIN OR 1 TABLET DAILY        VITAMIN E OR 1 TABLET DAILY        VITAMIN C OR 1 TABLET DAILY        FISH OIL 1000 MG OR CAPS 2 TABLET DAILY        B COMPLEX OR 1 TABLET DAILY        conjugated  estrogens (PREMARIN) vaginal cream Place 0.5 g vaginally twice a week (Patient not taking: Reported on 11/28/2017) 30 g 12     Cranberry Soft 500 MG CHEW        phenazopyridine (PYRIDIUM) 100 MG tablet Take 1-2 tablets (100-200 mg) by mouth 3 times daily as needed for urinary tract discomfort (Patient not taking: Reported on 11/28/2017) 30 tablet 0     Social History   Substance Use Topics     Smoking status: Never Smoker     Smokeless tobacco: Never Used     Alcohol use No     Allergies   Allergen Reactions     Brimonidine      Other reaction(s): Erythema       ROS:  Review of systems negative except as stated above.    EXAM:   /80 (BP Location: Left arm)  Pulse 85  Temp 98.4  F (36.9  C) (Temporal)  GENERAL APPEARANCE: healthy, alert and no distress  SKIN:   Distribution: dermatomal  Location: face    Color: reddish  Lesion type: vesicular, linear, blotchy with along right side of forehead and one vesicle on right eyelid    ASSESSMENT:    ICD-10-CM    1. Rash of face R21        PLAN:  Patient Instructions   Referred to urgent care this afternoon.      KAILA Taylor  [facility]

## 2017-12-04 DIAGNOSIS — H40.1132 PRIMARY OPEN ANGLE GLAUCOMA OF BOTH EYES, MODERATE STAGE: Primary | ICD-10-CM

## 2017-12-04 RX ORDER — TIMOLOL MALEATE 5 MG/ML
1 SOLUTION/ DROPS OPHTHALMIC DAILY
Qty: 10 ML | Refills: 3 | Status: SHIPPED | OUTPATIENT
Start: 2017-12-04 | End: 2018-02-28

## 2018-02-28 ENCOUNTER — OFFICE VISIT (OUTPATIENT)
Dept: OPHTHALMOLOGY | Facility: CLINIC | Age: 83
End: 2018-02-28
Payer: MEDICARE

## 2018-02-28 DIAGNOSIS — H25.10 SENILE NUCLEAR SCLEROSIS, UNSPECIFIED LATERALITY: ICD-10-CM

## 2018-02-28 DIAGNOSIS — H40.1132 PRIMARY OPEN ANGLE GLAUCOMA OF BOTH EYES, MODERATE STAGE: Primary | ICD-10-CM

## 2018-02-28 DIAGNOSIS — H26.8 PSEUDOEXFOLIATION OF LENS CAPSULE: ICD-10-CM

## 2018-02-28 DIAGNOSIS — H25.049 POSTERIOR SUBCAPSULAR POLAR SENILE CATARACT, UNSPECIFIED LATERALITY: ICD-10-CM

## 2018-02-28 RX ORDER — TIMOLOL MALEATE 5 MG/ML
1 SOLUTION/ DROPS OPHTHALMIC 2 TIMES DAILY
Qty: 30 ML | Refills: 3 | Status: SHIPPED | OUTPATIENT
Start: 2018-02-28 | End: 2018-03-02

## 2018-02-28 ASSESSMENT — CUP TO DISC RATIO
OS_RATIO: 0.45
OD_RATIO: 0.55

## 2018-02-28 ASSESSMENT — VISUAL ACUITY
OD_CC+: +2
OS_CC: 20/30
CORRECTION_TYPE: GLASSES
OD_BAT_MED: 20/50
METHOD: SNELLEN - LINEAR
OD_CC: 20/40

## 2018-02-28 ASSESSMENT — TONOMETRY
IOP_METHOD: APPLANATION
OS_IOP_MMHG: 22
OD_IOP_MMHG: 23

## 2018-02-28 ASSESSMENT — PACHYMETRY
OD_CT(UM): 530
OS_CT(UM): 532

## 2018-02-28 ASSESSMENT — SLIT LAMP EXAM - LIDS
COMMENTS: NORMAL
COMMENTS: NORMAL

## 2018-02-28 ASSESSMENT — EXTERNAL EXAM - LEFT EYE: OS_EXAM: NORMAL

## 2018-02-28 ASSESSMENT — CONF VISUAL FIELD
OD_NORMAL: 1
OS_NORMAL: 1

## 2018-02-28 ASSESSMENT — EXTERNAL EXAM - RIGHT EYE: OD_EXAM: NORMAL

## 2018-02-28 NOTE — MR AVS SNAPSHOT
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      Who to contact     Please call your clinic at [phone] to:    Ask questions about your health    Make or cancel appointments    Discuss your medicines    Learn about your test results    Speak to your doctor            Additional Information About Your Visit        JW PlayerharVipshop Information     tocario gives you secure access to your electronic health record. If you see a primary care provider, you can also send messages to your care team and make appointments. If you have questions, please call your primary care clinic.  If you do not have a primary care provider, please call [phone] and they will assist you.      tocario is an electronic gateway that provides easy, online access to your medical records. With tocario, you can request a clinic appointment, read your test results, renew a prescription or communicate with your care team.     To access your existing account, please contact your [facility] or call [phone] for assistance.        Care EveryWhere ID     This is your Care EveryWhere ID. This could be used by other organizations to access your [place] medical records  [id number]         Blood Pressure from Last 3 Encounters:    11/28/17 174/80   06/14/17 162/90   06/27/16 158/88    Weight from Last 3 Encounters:   06/14/17 70.3 kg (155 lb)   06/27/16 68.9 kg (152 lb)   04/04/16 69.9 kg (154 lb)              Today, you had the following     No orders found for display         Today's Medication Changes          These changes are accurate as of 2/28/18 11:59 PM.  If you have any questions, ask your nurse or doctor.               These medicines have changed or have updated prescriptions.        Dose/Directions    timolol 0.5 % ophthalmic solution   Commonly known as:  TIMOPTIC   This may have changed:  when to take this   Used for:  Primary open angle glaucoma of both eyes, moderate stage   Changed by:  Brian Hurtado MD        Dose:  1 drop   Place 1 drop into both eyes 2 times daily In the morning   Quantity:  30 mL   Refills:  3            Where to get your medicines      These medications were sent to [facility] [address] [place] [address] [employer] [facility]  [address] [facility] [address]     Phone:  [phone]     timolol 0.5 % ophthalmic solution                Primary Care Provider Office Phone # Fax #    Karla Meli Hurt [phone] [address] [facility] [address]        Equal Access to Services     VIJAYA MANDEL AH: Hadii imer ku hadasho Soomaali, waaxda luqadaha, qaybta kaalmada adeegyada, marty mcintyre hayrema ledesma. So [facility] [phone].    ATENCIÓN: Si habla español, tiene a pena disposición servicios gratuitos de asistencia lingüística. Llame al [phone].    We comply with applicable federal civil rights laws and [place] laws. We do not discriminate on the basis of race, color, national origin, age, disability, sex, sexual orientation, or gender identity.            Thank you!     Thank you for [facility] your [facility]. Our goal is always to provide you with excellent care. Hearing back from our patients is one  way we can continue to improve our services. Please take a few minutes to complete the written survey that you may receive in the mail after your visit with us. Thank you!             Your Updated Medication List - Protect others around you: Learn how to safely use, store and throw away your medicines at www.disposemymeds.org.          This list is accurate as of 2/28/18 11:59 PM.  Always use your most recent med list.                   Brand Name Dispense Instructions for use Diagnosis    B COMPLEX PO      1 TABLET DAILY        conjugated estrogens cream    PREMARIN    30 g    Place 0.5 g vaginally twice a week    Atrophic urethritis       Cranberry Soft 500 MG Chew           fish oil-omega-3 fatty acids 1000 MG capsule      2 TABLET DAILY        hydrochlorothiazide 25 MG tablet    HYDRODIURIL    90 tablet    Take 1 tablet (25 mg) by mouth daily    Hypertension goal BP (blood pressure) < 140/90       latanoprost 0.005 % ophthalmic solution    XALATAN    3 Bottle    Place 1 drop into both eyes At Bedtime RE    Primary open angle glaucoma of both eyes, moderate stage       lisinopril 10 MG tablet    PRINIVIL/ZESTRIL    90 tablet    Take 1 tablet (10 mg) by mouth daily    Hypertension goal BP (blood pressure) < 140/90       MULTI-VITAMIN PO      1 TABLET DAILY        phenazopyridine 100 MG tablet    PYRIDIUM    30 tablet    Take 1-2 tablets (100-200 mg) by mouth 3 times daily as needed for urinary tract discomfort    Abnormal urinalysis, Atrophic urethritis       timolol 0.5 % ophthalmic solution    TIMOPTIC    30 mL    Place 1 drop into both eyes 2 times daily In the morning    Primary open angle glaucoma of both eyes, moderate stage       VITAMIN C PO      1 TABLET DAILY        vitamin D 1000 UNITS capsule      1 CAPSULE DAILY        VITAMIN E PO      1 TABLET DAILY

## 2018-02-28 NOTE — NURSING NOTE
Chief Complaints and History of Present Illnesses   Patient presents with     Glaucoma Follow Up     HPI    Affected eye(s):  Both   Symptoms:        Duration:  4 months   Frequency:  Constant       Do you have eye pain now?:  No      Comments:  Timolol changed last visit to BID OU  Please sign RX for Drops  Pushpa Santos COT 10:52 AM February 28, 2018

## 2018-03-01 NOTE — PROGRESS NOTES
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 or sooner as needed.    Complete documentation of historical and exam elements from today's encounter can  be found in the full encounter summary report (not reduplicated in this progress  note). I personally obtained the chief complaint(s) and history of present illness. I  confirmed and edited as necessary the review of systems, past medical/surgical  history, family history, social history, and examination findings as documented by  others; and I examined the patient myself. I personally reviewed the relevant tests,  images, and reports as documented above. I formulated and edited as necessary the  assessment and plan and discussed the findings and management plan with the  patient and family.    KYLAH Hurtado M.D

## 2018-03-02 DIAGNOSIS — H40.1132 PRIMARY OPEN ANGLE GLAUCOMA OF BOTH EYES, MODERATE STAGE: Primary | ICD-10-CM

## 2018-03-05 NOTE — TELEPHONE ENCOUNTER
Refill request phoned in to [facility], [place] for Latanoprost refill.  I phoned patient who states she's out of Latanoprost. I informed her that I called pharmacy and requested, per pt, that it be filled promptly. Verified with pt that instructions are QHS to BE. She states this dosing is correct. Pt had seen Dr. Hurtado when in with  on Friday and she reminded him to refill, which he did not do.   Winnie MCMANUS 11:42 AM 03/05/2018

## 2018-03-06 RX ORDER — LATANOPROST 50 UG/ML
1 SOLUTION/ DROPS OPHTHALMIC AT BEDTIME
Qty: 7.5 ML | Refills: 3 | Status: SHIPPED | OUTPATIENT
Start: 2018-03-06

## 2018-03-06 RX ORDER — TIMOLOL MALEATE 5 MG/ML
1 SOLUTION/ DROPS OPHTHALMIC 2 TIMES DAILY
Qty: 30 ML | Refills: 3 | Status: SHIPPED | OUTPATIENT
Start: 2018-03-06

## 2018-03-27 ENCOUNTER — TELEPHONE (OUTPATIENT)
Dept: INTERNAL MEDICINE | Facility: CLINIC | Age: 83
End: 2018-03-27

## 2018-03-27 NOTE — TELEPHONE ENCOUNTER
Panel Management Review      Patient has the following on her problem list:     Hypertension   Last three blood pressure readings:  BP Readings from Last 3 Encounters:   11/28/17 174/80   06/14/17 162/90   06/27/16 158/88     Blood pressure: FAILED    HTN Guidelines:  Age 18-59 BP range:  Less than 140/90  Age 60-85 with Diabetes:  Less than 140/90  Age 60-85 without Diabetes:  less than 150/90      Composite cancer screening  Chart review shows that this patient is due/due soon for the following Colonoscopy  Summary:    Patient is due/failing the following:   Tetanus, Dexa , COLONOSCOPY and PHQ9    Action needed:   Routed to provider for review.    Type of outreach:    None, routed to provider for review.    Questions for provider review:    Would like us to call and schedule Patient for Appointment                                                                                                                                    Leyda Mckeon MA       Chart routed to Provider .

## 2018-05-09 ENCOUNTER — TRANSFERRED RECORDS (OUTPATIENT)
Dept: HEALTH INFORMATION MANAGEMENT | Facility: CLINIC | Age: 83
End: 2018-05-09

## 2018-07-11 ENCOUNTER — TRANSFERRED RECORDS (OUTPATIENT)
Dept: HEALTH INFORMATION MANAGEMENT | Facility: CLINIC | Age: 83
End: 2018-07-11

## 2018-08-20 ENCOUNTER — TRANSFERRED RECORDS (OUTPATIENT)
Dept: HEALTH INFORMATION MANAGEMENT | Facility: CLINIC | Age: 83
End: 2018-08-20

## 2018-08-24 ENCOUNTER — TRANSFERRED RECORDS (OUTPATIENT)
Dept: HEALTH INFORMATION MANAGEMENT | Facility: CLINIC | Age: 83
End: 2018-08-24

## 2018-09-11 DIAGNOSIS — I10 HYPERTENSION GOAL BP (BLOOD PRESSURE) < 140/90: ICD-10-CM

## 2018-09-12 RX ORDER — LISINOPRIL 10 MG/1
10 TABLET ORAL DAILY
Qty: 30 TABLET | Refills: 0 | Status: SHIPPED | OUTPATIENT
Start: 2018-09-12 | End: 2018-10-03

## 2018-09-12 RX ORDER — HYDROCHLOROTHIAZIDE 25 MG/1
25 TABLET ORAL DAILY
Qty: 30 TABLET | Refills: 0 | Status: SHIPPED | OUTPATIENT
Start: 2018-09-12 | End: 2018-10-03

## 2018-10-02 ASSESSMENT — ENCOUNTER SYMPTOMS
CHILLS: 0
BREAST MASS: 0
SORE THROAT: 0
PARESTHESIAS: 0
NAUSEA: 0
HEARTBURN: 0
SHORTNESS OF BREATH: 0
JOINT SWELLING: 0
DYSURIA: 0
FREQUENCY: 1
COUGH: 0
DIZZINESS: 0
HEMATURIA: 0
HEADACHES: 0
FEVER: 0
MYALGIAS: 1
WEAKNESS: 0
CONSTIPATION: 1
ABDOMINAL PAIN: 0
EYE PAIN: 0
ARTHRALGIAS: 0
DIARRHEA: 0
NERVOUS/ANXIOUS: 0
HEMATOCHEZIA: 0

## 2018-10-02 ASSESSMENT — ACTIVITIES OF DAILY LIVING (ADL)
I_NEED_ASSISTANCE_FOR_THE_FOLLOWING_DAILY_ACTIVITIES:: NO ASSISTANCE IS NEEDED
CURRENT_FUNCTION: NO ASSISTANCE NEEDED

## 2018-10-03 ENCOUNTER — OFFICE VISIT (OUTPATIENT)
Dept: INTERNAL MEDICINE | Facility: CLINIC | Age: 83
End: 2018-10-03
Payer: MEDICARE

## 2018-10-03 VITALS
DIASTOLIC BLOOD PRESSURE: 82 MMHG | TEMPERATURE: 97.6 F | HEIGHT: 64 IN | OXYGEN SATURATION: 99 % | RESPIRATION RATE: 16 BRPM | WEIGHT: 148.4 LBS | HEART RATE: 73 BPM | SYSTOLIC BLOOD PRESSURE: 148 MMHG | BODY MASS INDEX: 25.33 KG/M2

## 2018-10-03 DIAGNOSIS — R73.01 IMPAIRED FASTING GLUCOSE: ICD-10-CM

## 2018-10-03 DIAGNOSIS — E78.5 HYPERLIPIDEMIA LDL GOAL <130: ICD-10-CM

## 2018-10-03 DIAGNOSIS — E04.2 NONTOXIC MULTINODULAR GOITER: ICD-10-CM

## 2018-10-03 DIAGNOSIS — I10 HYPERTENSION GOAL BP (BLOOD PRESSURE) < 140/90: ICD-10-CM

## 2018-10-03 DIAGNOSIS — N63.22 MASS OF UPPER INNER QUADRANT OF LEFT BREAST: ICD-10-CM

## 2018-10-03 DIAGNOSIS — N63.25 BREAST LUMP ON LEFT SIDE AT 9 O'CLOCK POSITION: ICD-10-CM

## 2018-10-03 DIAGNOSIS — Z00.00 ROUTINE GENERAL MEDICAL EXAMINATION AT A HEALTH CARE FACILITY: Primary | ICD-10-CM

## 2018-10-03 DIAGNOSIS — D05.10 DUCTAL CARCINOMA IN SITU (DCIS) OF BREAST, UNSPECIFIED LATERALITY: ICD-10-CM

## 2018-10-03 LAB
ANION GAP SERPL CALCULATED.3IONS-SCNC: 7 MMOL/L (ref 3–14)
BUN SERPL-MCNC: 14 MG/DL (ref 7–30)
CALCIUM SERPL-MCNC: 10 MG/DL (ref 8.5–10.1)
CHLORIDE SERPL-SCNC: 104 MMOL/L (ref 94–109)
CHOLEST SERPL-MCNC: 239 MG/DL
CO2 SERPL-SCNC: 29 MMOL/L (ref 20–32)
CREAT SERPL-MCNC: 0.65 MG/DL (ref 0.52–1.04)
GFR SERPL CREATININE-BSD FRML MDRD: 87 ML/MIN/1.7M2
GLUCOSE SERPL-MCNC: 111 MG/DL (ref 70–99)
HBA1C MFR BLD: 5.5 % (ref 0–5.6)
HDLC SERPL-MCNC: 72 MG/DL
LDLC SERPL CALC-MCNC: 142 MG/DL
NONHDLC SERPL-MCNC: 167 MG/DL
POTASSIUM SERPL-SCNC: 4.1 MMOL/L (ref 3.4–5.3)
SODIUM SERPL-SCNC: 140 MMOL/L (ref 133–144)
TRIGL SERPL-MCNC: 124 MG/DL
TSH SERPL DL<=0.005 MIU/L-ACNC: 0.85 MU/L (ref 0.4–4)

## 2018-10-03 PROCEDURE — 84443 ASSAY THYROID STIM HORMONE: CPT | Performed by: INTERNAL MEDICINE

## 2018-10-03 PROCEDURE — 80048 BASIC METABOLIC PNL TOTAL CA: CPT | Performed by: INTERNAL MEDICINE

## 2018-10-03 PROCEDURE — G0439 PPPS, SUBSEQ VISIT: HCPCS | Performed by: INTERNAL MEDICINE

## 2018-10-03 PROCEDURE — 80061 LIPID PANEL: CPT | Performed by: INTERNAL MEDICINE

## 2018-10-03 PROCEDURE — 83036 HEMOGLOBIN GLYCOSYLATED A1C: CPT | Performed by: INTERNAL MEDICINE

## 2018-10-03 PROCEDURE — 36415 COLL VENOUS BLD VENIPUNCTURE: CPT | Performed by: INTERNAL MEDICINE

## 2018-10-03 RX ORDER — HYDROCHLOROTHIAZIDE 25 MG/1
25 TABLET ORAL DAILY
Qty: 90 TABLET | Refills: 11 | Status: SHIPPED | OUTPATIENT
Start: 2018-10-03 | End: 2019-07-10 | Stop reason: SINTOL

## 2018-10-03 RX ORDER — LISINOPRIL 10 MG/1
10 TABLET ORAL DAILY
Qty: 90 TABLET | Refills: 11 | Status: SHIPPED | OUTPATIENT
Start: 2018-10-03 | End: 2019-03-21

## 2018-10-03 ASSESSMENT — ENCOUNTER SYMPTOMS
EYE PAIN: 0
HEMATURIA: 0
WEAKNESS: 0
DIARRHEA: 0
HEADACHES: 0
FEVER: 0
FREQUENCY: 1
NERVOUS/ANXIOUS: 0
MYALGIAS: 1
PARESTHESIAS: 0
JOINT SWELLING: 0
CONSTIPATION: 1
COUGH: 0
DIZZINESS: 0
BREAST MASS: 0
CHILLS: 0
SORE THROAT: 0
HEARTBURN: 0
SHORTNESS OF BREATH: 0
HEMATOCHEZIA: 0
ARTHRALGIAS: 0
DYSURIA: 0
NAUSEA: 0
ABDOMINAL PAIN: 0

## 2018-10-03 ASSESSMENT — ACTIVITIES OF DAILY LIVING (ADL): CURRENT_FUNCTION: NO ASSISTANCE NEEDED

## 2018-10-03 NOTE — MR AVS SNAPSHOT
After Visit Summary   10/3/2018    Lacey Bah    MRN: [id number]           Patient Information     Date Of Birth          1935        Visit Information        Provider Department      10/3/2018 11:00 AM Karla Hurt MD [facility]        Today's Diagnoses     Routine general medical examination at a health care facility    -  1    Hypertension goal BP (blood pressure) < 140/90        Ductal carcinoma in situ (DCIS) of breast, unspecified laterality        Hyperlipidemia LDL goal <130        Impaired fasting glucose        Nontoxic multinodular goiter        Breast lump on left side at 9 o'clock position        Mass of upper inner quadrant of left breast           Care Instructions    I am going to send you in for additional imaging for the lump.    Labs today.    Preventive Health Recommendations    Female Ages 65 +    Yearly exam:     See your health care provider every year in order to  o Review health changes.   o Discuss preventive care.    o Review your medicines if your doctor has prescribed any.      You no longer need a yearly Pap test unless you've had an abnormal Pap test in the past 10 years. If you have vaginal symptoms, such as bleeding or discharge, be sure to talk with your provider about a Pap test.      Every 1 to 2 years, have a mammogram.  If you are over 69, talk with your health care provider about whether or not you want to continue having screening mammograms.      Every 10 years, have a colonoscopy. Or, have a yearly FIT test (stool test). These exams will check for colon cancer.       Have a cholesterol test every 5 years, or more often if your doctor advises it.       Have a diabetes test (fasting glucose) every three years. If you are at risk for diabetes, you should have this test more often.       At age 65, have a bone density scan (DEXA) to check for osteoporosis (brittle bone disease).    Shots:    Get a flu shot each year.    Get a tetanus  shot every 10 years.    Talk to your doctor about your pneumonia vaccines. There are now two you should receive - Pneumovax (PPSV 23) and Prevnar (PCV 13).    Talk to your pharmacist about the shingles vaccine.    Talk to your doctor about the hepatitis B vaccine.    Nutrition:     Eat at least 5 servings of fruits and vegetables each day.      Eat whole-grain bread, whole-wheat pasta and brown rice instead of white grains and rice.      Get adequate Calcium and Vitamin D.     Lifestyle    Exercise at least 150 minutes a week (30 minutes a day, 5 days a week). This will help you control your weight and prevent disease.      Limit alcohol to one drink per day.      No smoking.       Wear sunscreen to prevent skin cancer.       See your dentist twice a year for an exam and cleaning.      See your eye doctor every 1 to 2 years to screen for conditions such as glaucoma, macular degeneration and cataracts.  [facility]    If you have any questions regarding to your visit please contact your care team:     Team Pink:   Clinic Hours Telephone Number   Internal Medicine:  Dr. Karla Mueller, NP 7am-7pm  Monday - Thursday   7am-5pm  Fridays  [phone]  (Appointment scheduling available 24/7)   [facility] 11am-9pm Monday-Friday Saturday-Sunday- 9am-5pm   [place] - 5pm-9pm Monday-Friday Saturday-Sunday- 9am-5pm  [phone] - Jessica Tadeo  [phone] - [place]       What options do I have for a visit other than an office visit? We offer electronic visits (e-visits) and telephone visits, when medically appropriate.  Please check with your medical insurance to see if these types of visits are covered, as you will be responsible for any charges that are not paid by your insurance.      You can use [employer] (secure electronic communication) to access to your chart, send your primary care provider a message, or make an appointment.  Ask a team member how to get started.       For a price quote for your services, please call our Consumer Price Line at [phone] or our Imaging Cost estimation line at [phone] (for imaging tests).    Ember Rivera MA              Follow-ups after your visit        Future tests that were ordered for you today     Open Future Orders        Priority Expected Expires Ordered    US Breast Left Limited 1-3 Quadrants Routine  10/3/2019 10/3/2018    MA Diagnostic Digital Bilateral Routine  10/3/2019 10/3/2018            Who to contact     If you have questions or need follow up information about today's clinic visit or your schedule please contact [facility] directly at [phone].  Normal or non-critical lab and imaging results will be communicated to you by [employer], letter or phone within 4 business days after the clinic has received the results. If you do not hear from us within 7 days, please contact the clinic through [employer] or phone. If you have a critical or abnormal lab result, we will notify you by phone as soon as possible.  Submit refill requests through [employer] or call your pharmacy and they will forward the refill request to us. Please allow 3 business days for your refill to be completed.          Additional Information About Your Visit        [employer] Information     [employer] gives you secure access to your electronic health record. If you see a primary care provider, you can also send messages to your care team and make appointments. If you have questions, please call your primary care clinic.  If you do not have a primary care provider, please call [phone] and they will assist you.        Care EveryWhere ID     This is your Care EveryWhere ID. This could be used by other organizations to access your [place] medical records  [id number]        Your Vitals Were     Pulse Temperature Respirations Height Pulse Oximetry BMI (Body Mass Index)    73 97.6  F (36.4  C) (Oral) 16 5'  "4.02\" (1.626 m) 99% 25.46 kg/m2       Blood Pressure from Last 3 Encounters:   10/03/18 148/82   11/28/17 174/80   06/14/17 162/90    Weight from Last 3 Encounters:   10/03/18 148 lb 6.4 oz (67.3 kg)   06/14/17 155 lb (70.3 kg)   06/27/16 152 lb (68.9 kg)              We Performed the Following     Basic metabolic panel     Hemoglobin A1c     Lipid panel reflex to direct LDL Fasting     TSH with free T4 reflex          Today's Medication Changes          These changes are accurate as of 10/3/18 11:48 AM.  If you have any questions, ask your nurse or doctor.               These medicines have changed or have updated prescriptions.        Dose/Directions    hydrochlorothiazide 25 MG tablet   Commonly known as:  HYDRODIURIL   This may have changed:  additional instructions   Used for:  Hypertension goal BP (blood pressure) < 140/90   Changed by:  Karla Hurt MD        Dose:  25 mg   Take 1 tablet (25 mg) by mouth daily   Quantity:  90 tablet   Refills:  11       lisinopril 10 MG tablet   Commonly known as:  PRINIVIL/ZESTRIL   This may have changed:  additional instructions   Used for:  Hypertension goal BP (blood pressure) < 140/90   Changed by:  Karla Hurt MD        Dose:  10 mg   Take 1 tablet (10 mg) by mouth daily   Quantity:  90 tablet   Refills:  11            Where to get your medicines      These medications were sent to [facility] [address] [place] [id number] [facility]  [address] [facility] [address]     Phone:  [phone]     hydrochlorothiazide 25 MG tablet    lisinopril 10 MG tablet                Primary Care Provider Office Phone # Fax #    Karla Hurt [phone] [address] [facility] [address]        Equal Access to Services     [facility] MINISTERIO AH: Hadii imer leoo Soyasmany, waaxda luqadaha, qaybta kaalmada adeegyada, marty ledesma. So [facility] [phone].    ATENCIÓN: Si habla español, tiene a pena " disposición servicios gratuitos de asistencia lingüística. Rock kelly [phone].    We comply with applicable federal civil rights laws and [place] laws. We do not discriminate on the basis of race, color, national origin, age, disability, sex, sexual orientation, or gender identity.            Thank you!     Thank you for choosing [facility]  for your care. Our goal is always to provide you with excellent care. Hearing back from our patients is one way we can continue to improve our services. Please take a few minutes to complete the written survey that you may receive in the mail after your visit with us. Thank you!             Your Updated Medication List - Protect others around you: Learn how to safely use, store and throw away your medicines at www.disposemymeds.org.          This list is accurate as of 10/3/18 11:48 AM.  Always use your most recent med list.                   Brand Name Dispense Instructions for use Diagnosis    B COMPLEX PO      1 TABLET DAILY        conjugated estrogens cream    PREMARIN    30 g    Place 0.5 g vaginally twice a week    Atrophic urethritis       Cranberry Soft 500 MG Chew           fish oil-omega-3 fatty acids 1000 MG capsule      2 TABLET DAILY        hydrochlorothiazide 25 MG tablet    HYDRODIURIL    90 tablet    Take 1 tablet (25 mg) by mouth daily    Hypertension goal BP (blood pressure) < 140/90       latanoprost 0.005 % ophthalmic solution    XALATAN    7.5 mL    Place 1 drop into both eyes At Bedtime RE    Primary open angle glaucoma of both eyes, moderate stage       lisinopril 10 MG tablet    PRINIVIL/ZESTRIL    90 tablet    Take 1 tablet (10 mg) by mouth daily    Hypertension goal BP (blood pressure) < 140/90       MULTI-VITAMIN PO      1 TABLET DAILY        phenazopyridine 100 MG tablet    PYRIDIUM    30 tablet    Take 1-2 tablets (100-200 mg) by mouth 3 times daily as needed for urinary tract discomfort    Abnormal urinalysis, Atrophic urethritis        timolol 0.5 % ophthalmic solution    TIMOPTIC    30 mL    Place 1 drop into both eyes 2 times daily    Primary open angle glaucoma of both eyes, moderate stage       VITAMIN C PO      1 TABLET DAILY        vitamin D 1000 units capsule      1 CAPSULE DAILY        VITAMIN E PO      1 TABLET DAILY

## 2018-10-03 NOTE — PATIENT INSTRUCTIONS
I am going to send you in for additional imaging for the lump.    Labs today.    Preventive Health Recommendations    Female Ages 65 +    Yearly exam:     See your health care provider every year in order to  o Review health changes.   o Discuss preventive care.    o Review your medicines if your doctor has prescribed any.      You no longer need a yearly Pap test unless you've had an abnormal Pap test in the past 10 years. If you have vaginal symptoms, such as bleeding or discharge, be sure to talk with your provider about a Pap test.      Every 1 to 2 years, have a mammogram.  If you are over 69, talk with your health care provider about whether or not you want to continue having screening mammograms.      Every 10 years, have a colonoscopy. Or, have a yearly FIT test (stool test). These exams will check for colon cancer.       Have a cholesterol test every 5 years, or more often if your doctor advises it.       Have a diabetes test (fasting glucose) every three years. If you are at risk for diabetes, you should have this test more often.       At age 65, have a bone density scan (DEXA) to check for osteoporosis (brittle bone disease).    Shots:    Get a flu shot each year.    Get a tetanus shot every 10 years.    Talk to your doctor about your pneumonia vaccines. There are now two you should receive - Pneumovax (PPSV 23) and Prevnar (PCV 13).    Talk to your pharmacist about the shingles vaccine.    Talk to your doctor about the hepatitis B vaccine.    Nutrition:     Eat at least 5 servings of fruits and vegetables each day.      Eat whole-grain bread, whole-wheat pasta and brown rice instead of white grains and rice.      Get adequate Calcium and Vitamin D.     Lifestyle    Exercise at least 150 minutes a week (30 minutes a day, 5 days a week). This will help you control your weight and prevent disease.      Limit alcohol to one drink per day.      No smoking.       Wear sunscreen to prevent skin cancer.        See your dentist twice a year for an exam and cleaning.      See your eye doctor every 1 to 2 years to screen for conditions such as glaucoma, macular degeneration and cataracts.  [facility]    If you have any questions regarding to your visit please contact your care team:     Team Pink:   Clinic Hours Telephone Number   Internal Medicine:  Dr. Karla Mueller NP 7am-7pm  Monday - Thursday   7am-5pm  Fridays  [phone]  (Appointment scheduling available 24/7)   Urgent Care - [place] [facility] - 11am-9pm Monday-Friday Saturday-Sunday- 9am-5pm   [place] - 5pm-9pm Monday-Friday Saturday-Sunday- 9am-5pm  [phone] - [place]  [phone] - [place]       What options do I have for a visit other than an office visit? We offer electronic visits (e-visits) and telephone visits, when medically appropriate.  Please check with your medical insurance to see if these types of visits are covered, as you will be responsible for any charges that are not paid by your insurance.      You can use [employer] (secure electronic communication) to access to your chart, send your primary care provider a message, or make an appointment. Ask a team member how to get started.       For a price quote for your services, please call our Consumer Price Line at [phone] or our Imaging Cost estimation line at [phone] (for imaging tests).    Ember Rivera MA

## 2018-10-03 NOTE — PROGRESS NOTES
"SUBJECTIVE:   Lacey Bah is a 82 year old female who presents for Preventive Visit.  Are you in the first 12 months of your Medicare coverage?  No    Physical   Annual:     Getting at least 3 servings of Calcium per day:  Yes    Bi-annual eye exam:  Yes    Dental care twice a year:  Yes    Sleep apnea or symptoms of sleep apnea:  None    Diet:  Regular (no restrictions)    Frequency of exercise:  2-3 days/week    Duration of exercise:  45-60 minutes    Taking medications regularly:  Yes    Medication side effects:  None    Additional concerns today:  No    Ability to successfully perform activities of daily living: no assistance needed    Home Safety:  No safety concerns identified    Hearing Impairment: no hearing concerns    Muscle pain:  Patient reports of right side and right shoulder pain that radiates. She increased her activity level on the machine as well as out in her garden which exacerbated her pain. She was evaluated by a chiropractor who suggested she use ice application for pain which she has been actively doing.     GI issues:  Patient reports of constipation \"all her life\" and is using Miralax to help regulate her stools. Her last colonoscopy was in 2012. She denies blood or tarry stools.    Mammogram and DCIS:  Patient with DCIS and continues yearly mammograms. She had her most recent mammogram completed on 8/20/18 which was normal.      Fall risk:       COGNITIVE SCREEN-Patient declines screening.  1) Repeat 3 items (Leader, Season, Table)    2) Clock draw: declined  3) 3 item recall: declined  Results: Pt declined screening today.    Mini-CogTM Copyright SYED Johnston. Licensed by the author for use in [facility]; reprinted with permission (arnulfo@.[facility]). All rights reserved.      Reviewed and updated as needed this visit by clinical staff  Tobacco  Allergies  Meds         Reviewed and updated as needed this visit by Provider        Social History   Substance Use Topics     Smoking " status: Never Smoker     Smokeless tobacco: Never Used     Alcohol use No       Alcohol Use 10/2/2018   If you drink alcohol do you typically have greater than 3 drinks per day OR greater than 7 drinks per week? Not Applicable       Today's PHQ-2 Score:   PHQ-2 ( 1999 [employer]) 10/2/2018   Q1: Little interest or pleasure in doing things 0   Q2: Feeling down, depressed or hopeless 0   PHQ-2 Score 0   Q1: Little interest or pleasure in doing things Not at all   Q2: Feeling down, depressed or hopeless Not at all   PHQ-2 Score 0     Do you feel safe in your environment - Yes    Do you have a Health Care Directive?: No: Advance care planning was reviewed with patient; patient declined at this time.    Current providers sharing in care for this patient include:   Patient Care Team:  Karla Hurt MD as PCP - General    The following health maintenance items are reviewed in Epic and correct as of today:  Health Maintenance   Topic Date Due     ADVANCE DIRECTIVE PLANNING Q5 YRS  10/31/2017     MEDICARE ANNUAL WELLNESS VISIT  06/14/2018     INFLUENZA VACCINE (1) 09/01/2018     FALL RISK ASSESSMENT  02/28/2019     PHQ-2 Q1 YR  10/03/2019     TETANUS IMMUNIZATION (SYSTEM ASSIGNED)  07/12/2025     PNEUMO 5YR BOOST DUE AFTER AGE 65 (NO IB MSG)  Addressed     DEXA SCAN SCREENING (SYSTEM ASSIGNED)  Completed     PNEUMOCOCCAL  Addressed     Labs reviewed in [facility]    Mammogram Screening: Patient over age 75, has elected to continue with mammography screening.    Review of Systems   Constitutional: Negative for chills and fever.   HENT: Negative for congestion, ear pain, hearing loss and sore throat.    Eyes: Negative for pain.   Respiratory: Negative for cough and shortness of breath.    Cardiovascular: Negative for chest pain and peripheral edema.   Gastrointestinal: Positive for constipation. Negative for abdominal pain, diarrhea, heartburn, hematochezia and nausea.   Breasts:  Negative for tenderness, breast mass and discharge.  "  Genitourinary: Positive for frequency. Negative for dysuria, genital sores, hematuria, pelvic pain, vaginal bleeding and vaginal discharge.   Musculoskeletal: Positive for myalgias. Negative for arthralgias and joint swelling.   Skin: Negative for rash.   Neurological: Negative for dizziness, weakness, headaches and paresthesias.   Psychiatric/Behavioral: Negative for mood changes. The patient is not nervous/anxious.      This document serves as a record of the services and decisions personally performed and made by KRAIG PEÑA MD. It was created on her behalf by Kayla Hogan, a trained medical scribe. The creation of this document is based the provider's statements to the medical scribe.    Kayla Hogan October 3, 2018 11:31 AM    OBJECTIVE:   Pulse 73  Temp 97.6  F (36.4  C) (Oral)  Resp 16  Ht 5' 4.02\" (1.626 m)  Wt 148 lb 6.4 oz (67.3 kg)  SpO2 99%  BMI 25.46 kg/m2 Estimated body mass index is 25.46 kg/(m^2) as calculated from the following:    Height as of this encounter: 5' 4.02\" (1.626 m).    Weight as of this encounter: 148 lb 6.4 oz (67.3 kg).  Physical Exam  GENERAL APPEARANCE: healthy, alert and no distress  EYES: Eyes grossly normal to inspection, PERRL and conjunctivae and sclerae normal  HENT: ear canals and TM's normal, nose and mouth without ulcers or lesions, oropharynx clear and oral mucous membranes moist  NECK: no adenopathy, no asymmetry, masses, or scars.  Mildly enlarged thyroid  RESP: lungs clear to auscultation - no rales, rhonchi or wheezes  BREAST: RIGHT: normal without masses, tenderness or nipple discharge and no palpable axillary masses or adenopathy. LEFT: Lump at the 9 o'clock position under the aerola of left breast. No nipple discharge noted.  CV: regular rate and rhythm, normal S1 S2, no S3 or S4, no murmur, click or rub, no peripheral edema and peripheral pulses strong  ABDOMEN: soft, nontender, no hepatosplenomegaly, no masses and bowel sounds normal   (female): normal " female external genitalia, no abnormal discharge  MS: no musculoskeletal defects are noted and gait is age appropriate without ataxia  SKIN: no suspicious lesions or rashes. Seborrheic keratosis on back and forehead  NEURO: Normal strength and tone, sensory exam grossly normal, mentation intact and speech normal  PSYCH: mentation appears normal and affect normal/bright    Diagnostic Test Results:  No results found for this or any previous visit (from the past 24 hour(s)).    ASSESSMENT / PLAN:   1. Routine general medical examination at a health care facility  Reviewed health care maintenance and updating as needed.  Patient declined cognitive screening today.  Patient does not have an advanced directive on file and declined information on health care directives.      2.  Breast lump on left side at 9 o'clock position  She continues with yearly mammograms. Most recent mammogram completed on 8/20/18 which was unremarkable. During exam today, new lump at the 9 o'clock position under the aerola of left breast noted. Not recently noted on prior exams. Recommended additional imaging for patient to complete due to her history of DCIS. Referral placed.  - US Breast Left Limited 1-3 Quadrants; Future  - MA Diagnostic Digital Bilateral; Future    3. Mass of upper inner quadrant of left breast   As above.  - US Breast Left Limited 1-3 Quadrants; Future  - MA Diagnostic Digital Bilateral; Future     4. Ductal carcinoma in situ (DCIS) of breast, unspecified laterality  As above.   - US Breast Left Limited 1-3 Quadrants; Future  - MA Diagnostic Digital Bilateral; Future    4. Hyperlipidemia LDL goal <130  Stable. Completing labs today and will notify with results.  - Lipid panel reflex to direct LDL Fasting    5. Hypertension goal BP (blood pressure) < 140/90  Elevated after recheck. She will return to pharmacy for BP check. Recommended bringing home monitor with her to the pharmacy. If still elevated will make necessary changes  "to medication regimen as needed.  - hydrochlorothiazide (HYDRODIURIL) 25 MG tablet; Take 1 tablet (25 mg) by mouth daily  Dispense: 90 tablet; Refill: 11  - lisinopril (PRINIVIL/ZESTRIL) 10 MG tablet; Take 1 tablet (10 mg) by mouth daily  Dispense: 90 tablet; Refill: 11  - Basic metabolic panel     7. Impaired fasting glucose  Stable. A1C 5.5 today.  Lab Results   Component Value Date    A1C 5.5 10/03/2018    A1C 5.4 06/14/2017    A1C 5.7 02/06/2015    A1C 5.3 03/02/2012       - Hemoglobin A1c    6. Nontoxic multinodular goiter  Stable. Checking thyroid labs today and will notify with results.  - TSH with free T4 reflex    7. Hyperlipidemia LDL goal <130  Stable. Completing lab work today and will notify with results.  - Lipid panel reflex to direct LDL Fasting      Patient Instructions     I am going to send you in for additional imaging for the lump.    Labs today.      End of Life Planning:  Patient currently has an advanced directive: No.  I have verified the patient's ablity to prepare an advanced directive/make health care decisions.  Literature was provided to assist patient in preparing an advanced directive.    COUNSELING:  Special attention given to:       Regular exercise       Healthy diet/nutrition       Advanced Planning        Mammograms    BP Readings from Last 1 Encounters:   11/28/17 174/80     Estimated body mass index is 25.46 kg/(m^2) as calculated from the following:    Height as of this encounter: 5' 4.02\" (1.626 m).    Weight as of this encounter: 148 lb 6.4 oz (67.3 kg).      Weight management plan: Discussed healthy diet and exercise guidelines and patient will follow up in 12 months in clinic to re-evaluate.     reports that she has never smoked. She has never used smokeless tobacco.      Appropriate preventive services were discussed with this patient, including applicable screening as appropriate for cardiovascular disease, diabetes, osteopenia/osteoporosis, and glaucoma.  As appropriate " for age/gender, discussed screening for colorectal cancer, prostate cancer, breast cancer, and cervical cancer. Checklist reviewing preventive services available has been given to the patient.    Reviewed patients plan of care and provided an AVS. The Basic Care Plan (routine screening as documented in Health Maintenance) for Lacey meets the Care Plan requirement. This Care Plan has been established and reviewed with the Patient.    Counseling Resources:  ATP IV Guidelines  Pooled Cohorts Equation Calculator  Breast Cancer Risk Calculator  FRAX Risk Assessment  ICSI Preventive Guidelines  Dietary Guidelines for Americans, 2010  [employer]'s MyPlate  ASA Prophylaxis  Lung CA Screening    I spent 16 minutes of time with the patient and >50% of it was in education and counseling regarding preventative health.    The information in this document, created by the medical scribe for me, accurately reflects the services I personally performed and the decisions made by me. I have reviewed and approved this document for accuracy prior to leaving the patient care area.    Karla Hurt MD  [facility]  START: 11:31 AM  END: 11:47 AM    Patient Instructions     I am going to send you in for additional imaging for the lump.    Labs today.    Preventive Health Recommendations    Female Ages 65 +    Yearly exam:     See your health care provider every year in order to  o Review health changes.   o Discuss preventive care.    o Review your medicines if your doctor has prescribed any.      You no longer need a yearly Pap test unless you've had an abnormal Pap test in the past 10 years. If you have vaginal symptoms, such as bleeding or discharge, be sure to talk with your provider about a Pap test.      Every 1 to 2 years, have a mammogram.  If you are over 69, talk with your health care provider about whether or not you want to continue having screening mammograms.      Every 10 years, have a colonoscopy. Or, have a yearly  FIT test (stool test). These exams will check for colon cancer.       Have a cholesterol test every 5 years, or more often if your doctor advises it.       Have a diabetes test (fasting glucose) every three years. If you are at risk for diabetes, you should have this test more often.       At age 65, have a bone density scan (DEXA) to check for osteoporosis (brittle bone disease).    Shots:    Get a flu shot each year.    Get a tetanus shot every 10 years.    Talk to your doctor about your pneumonia vaccines. There are now two you should receive - Pneumovax (PPSV 23) and Prevnar (PCV 13).    Talk to your pharmacist about the shingles vaccine.    Talk to your doctor about the hepatitis B vaccine.    Nutrition:     Eat at least 5 servings of fruits and vegetables each day.      Eat whole-grain bread, whole-wheat pasta and brown rice instead of white grains and rice.      Get adequate Calcium and Vitamin D.     Lifestyle    Exercise at least 150 minutes a week (30 minutes a day, 5 days a week). This will help you control your weight and prevent disease.      Limit alcohol to one drink per day.      No smoking.       Wear sunscreen to prevent skin cancer.       See your dentist twice a year for an exam and cleaning.      See your eye doctor every 1 to 2 years to screen for conditions such as glaucoma, macular degeneration and cataracts.  [facility]    If you have any questions regarding to your visit please contact your care team:     Team Pink:   Clinic Hours Telephone Number   Internal Medicine:  Dr. Karla Mueller NP 7am-7pm  Monday - Thursday   7am-5pm  Fridays  [phone]  (Appointment scheduling available 24/7)   Urgent Care - [place] - 11am-9pm Monday-Friday Saturday-Sunday- 9am-5pm   [place] - 5pm-9pm Monday-Friday Saturday-Sunday- 9am-5pm  [phone] - Jessica Tadeo  [phone] - [place]       What options do I have for a  visit other than an office visit? We offer electronic visits (e-visits) and telephone visits, when medically appropriate.  Please check with your medical insurance to see if these types of visits are covered, as you will be responsible for any charges that are not paid by your insurance.      You can use [facility] (secure electronic communication) to access to your chart, send your primary care provider a message, or make an appointment. Ask a team member how to get started.       For a price quote for your services, please call our Consumer Price Line at [phone] or our Imaging Cost estimation line at [phone] (for imaging tests).    Ember Rivera MA

## 2018-10-04 ENCOUNTER — TRANSFERRED RECORDS (OUTPATIENT)
Dept: HEALTH INFORMATION MANAGEMENT | Facility: CLINIC | Age: 83
End: 2018-10-04

## 2018-10-04 NOTE — PROGRESS NOTES
Normal thyroid. Normal electrolytes. Normal kidney function. Continued mild elevation of blood sugar.  Cholesterol remains elevated but stable.

## 2019-03-21 ENCOUNTER — TELEPHONE (OUTPATIENT)
Dept: FAMILY MEDICINE | Facility: CLINIC | Age: 84
End: 2019-03-21

## 2019-03-21 DIAGNOSIS — I10 HYPERTENSION GOAL BP (BLOOD PRESSURE) < 140/90: ICD-10-CM

## 2019-03-21 RX ORDER — LISINOPRIL 20 MG/1
20 TABLET ORAL DAILY
Qty: 30 TABLET | Refills: 1 | Status: SHIPPED | OUTPATIENT
Start: 2019-03-21 | End: 2019-04-08

## 2019-03-21 NOTE — TELEPHONE ENCOUNTER
Reason for Call:  Other prescription    Detailed comments: Patient calling states her blood pressure has been running high around 160. Has an appointment with Dr. Hurt 04/22/19. That was the first available appointment. Can medication be increased until she can get in? Please advise    Phone Number Patient can be reached at: Home number on file [phone] (home)    Best Time: Any    Can we leave a detailed message on this number? YES    Call taken on 3/21/2019 at 11:35 AM by Ayesha Eagle

## 2019-03-21 NOTE — TELEPHONE ENCOUNTER
Patient notified of Provider's message as written.  Patient verbalized understanding.    Prescription updated  Appointment scheduled for 4/8/19 with Dr. Blu Neves, RN

## 2019-03-21 NOTE — TELEPHONE ENCOUNTER
"Per patient, her BP at home has been elevated \"for a while\"  She was not able to verify how long this has been going on   SBP has been around 140-160's  DBP and pulse have been ok  Continues on Lisinopril 10mg daily and hydrochlorothiazide 25mg daily  Denies any symptoms and stated that she feels good    Patient understands that Dr. Hurt is not in today  Please advise    Jono Neves RN    "

## 2019-03-21 NOTE — TELEPHONE ENCOUNTER
Increase Lisinopril to 20 mg daily.  1 month supply with 1 refill.   Follow up in 2 weeks with Flor Mueller or myself for blood pressure check and lab draw

## 2019-04-08 ENCOUNTER — OFFICE VISIT (OUTPATIENT)
Dept: INTERNAL MEDICINE | Facility: CLINIC | Age: 84
End: 2019-04-08
Payer: MEDICARE

## 2019-04-08 VITALS
BODY MASS INDEX: 24.86 KG/M2 | WEIGHT: 145.6 LBS | DIASTOLIC BLOOD PRESSURE: 90 MMHG | HEIGHT: 64 IN | SYSTOLIC BLOOD PRESSURE: 200 MMHG | TEMPERATURE: 98.6 F | HEART RATE: 76 BPM | RESPIRATION RATE: 16 BRPM | OXYGEN SATURATION: 99 %

## 2019-04-08 DIAGNOSIS — I10 ESSENTIAL HYPERTENSION: Primary | ICD-10-CM

## 2019-04-08 DIAGNOSIS — D72.829 LEUKOCYTOSIS, UNSPECIFIED TYPE: ICD-10-CM

## 2019-04-08 DIAGNOSIS — I10 HYPERTENSION GOAL BP (BLOOD PRESSURE) < 140/90: ICD-10-CM

## 2019-04-08 LAB
ALBUMIN UR-MCNC: NEGATIVE MG/DL
AMORPH CRY #/AREA URNS HPF: ABNORMAL /HPF
APPEARANCE UR: ABNORMAL
BILIRUB UR QL STRIP: NEGATIVE
COLOR UR AUTO: YELLOW
ERYTHROCYTE [DISTWIDTH] IN BLOOD BY AUTOMATED COUNT: 13.4 % (ref 10–15)
GLUCOSE UR STRIP-MCNC: NEGATIVE MG/DL
HCT VFR BLD AUTO: 45.6 % (ref 35–47)
HGB BLD-MCNC: 15.2 G/DL (ref 11.7–15.7)
HGB UR QL STRIP: NEGATIVE
HYALINE CASTS #/AREA URNS LPF: ABNORMAL /LPF
KETONES UR STRIP-MCNC: NEGATIVE MG/DL
LEUKOCYTE ESTERASE UR QL STRIP: ABNORMAL
MCH RBC QN AUTO: 30.8 PG (ref 26.5–33)
MCHC RBC AUTO-ENTMCNC: 33.3 G/DL (ref 31.5–36.5)
MCV RBC AUTO: 92 FL (ref 78–100)
NITRATE UR QL: NEGATIVE
NON-SQ EPI CELLS #/AREA URNS LPF: ABNORMAL /LPF
PH UR STRIP: 7 PH (ref 5–7)
PLATELET # BLD AUTO: 347 10E9/L (ref 150–450)
RBC # BLD AUTO: 4.94 10E12/L (ref 3.8–5.2)
RBC #/AREA URNS AUTO: ABNORMAL /HPF
SOURCE: ABNORMAL
SP GR UR STRIP: 1.02 (ref 1–1.03)
UROBILINOGEN UR STRIP-ACNC: 0.2 EU/DL (ref 0.2–1)
WBC # BLD AUTO: 11.7 10E9/L (ref 4–11)
WBC #/AREA URNS AUTO: ABNORMAL /HPF

## 2019-04-08 PROCEDURE — 81001 URINALYSIS AUTO W/SCOPE: CPT | Performed by: INTERNAL MEDICINE

## 2019-04-08 PROCEDURE — 85025 COMPLETE CBC W/AUTO DIFF WBC: CPT | Performed by: INTERNAL MEDICINE

## 2019-04-08 PROCEDURE — 80048 BASIC METABOLIC PNL TOTAL CA: CPT | Performed by: INTERNAL MEDICINE

## 2019-04-08 PROCEDURE — 36415 COLL VENOUS BLD VENIPUNCTURE: CPT | Performed by: INTERNAL MEDICINE

## 2019-04-08 PROCEDURE — 99213 OFFICE O/P EST LOW 20 MIN: CPT | Performed by: INTERNAL MEDICINE

## 2019-04-08 RX ORDER — LISINOPRIL 40 MG/1
40 TABLET ORAL DAILY
Qty: 90 TABLET | Refills: 1 | Status: SHIPPED | OUTPATIENT
Start: 2019-04-08 | End: 2019-07-01

## 2019-04-08 ASSESSMENT — MIFFLIN-ST. JEOR: SCORE: 1104.41

## 2019-04-08 ASSESSMENT — PAIN SCALES - GENERAL: PAINLEVEL: NO PAIN (0)

## 2019-04-08 NOTE — PROGRESS NOTES
SUBJECTIVE:                                                    Lacey Bah is a 83 year old female who presents to clinic today for the following health issues:        Medication Followup of Lisinopril    Taking Medication as prescribed: yes    Side Effects:  None    Medication Helping Symptoms:  yes     Dose increased to Lisinopril 20 mg after systolic blood pressure was greater than 160.  Blood pressure now is 130-150 systolic with diastolics in the 60-70- range     She had atenolol stopped due to her eye drops.          Problem list and histories reviewed & adjusted, as indicated.  Additional history: as documented    Patient Active Problem List   Diagnosis     Hypertension goal BP (blood pressure) < 140/90     DCIS (ductal carcinoma in situ) of breast     HTN (hypertension)     Uterine disorder     Impaired fasting glucose     Hyperlipidemia LDL goal <130     Family history of colon cancer     Advanced directives, counseling/discussion     Tubular adenoma of colon     Skin exam, screening for cancer     Nontoxic multinodular goiter     Primary open angle glaucoma     Senile nuclear sclerosis     Past Surgical History:   Procedure Laterality Date     CATARACT IOL, RT/LT Left 04/15/2014     GLAUCOMA SURGERY Left 11/04/1997    trabulect     LASER SELECTIVE TRABECULOPLASTY  2008     SURGICAL HISTORY OF -   1964    THYROID NODULE      SURGICAL HISTORY OF -   2008    LUMPECTOMY       Social History     Tobacco Use     Smoking status: Never Smoker     Smokeless tobacco: Never Used   Substance Use Topics     Alcohol use: No     Family History   Problem Relation Age of Onset     Diabetes Mother      Glaucoma Mother      Cancer - colorectal Father      Cancer Sister         OVARIAN     Cancer Sister         pancreatic cancer     Diabetes Sister      Glaucoma Sister      Diabetes Sister      Glaucoma Sister      Diabetes Sister      Macular Degeneration No family hx of          Current Outpatient Medications  "  Medication Sig Dispense Refill     B COMPLEX OR 1 TABLET DAILY        FISH OIL 1000 MG OR CAPS 2 TABLET DAILY        hydrochlorothiazide (HYDRODIURIL) 25 MG tablet Take 1 tablet (25 mg) by mouth daily 90 tablet 11     latanoprost (XALATAN) 0.005 % ophthalmic solution Place 1 drop into both eyes At Bedtime RE 7.5 mL 3     lisinopril (PRINIVIL/ZESTRIL) 40 MG tablet Take 1 tablet (40 mg) by mouth daily 90 tablet 1     MULTI-VITAMIN OR 1 TABLET DAILY        timolol (TIMOPTIC) 0.5 % ophthalmic solution Place 1 drop into both eyes 2 times daily 30 mL 3     VITAMIN C OR 1 TABLET DAILY        VITAMIN D 1000 UNIT OR CAPS 1 CAPSULE DAILY       VITAMIN E OR 1 TABLET DAILY        Allergies   Allergen Reactions     Brimonidine Other (See Comments)     Other reaction(s): Erythema       ROS:   ROS: 4 point ROS neg other than the symptoms noted above in the HPI.      OBJECTIVE:     /90 (BP Location: Left arm, Cuff Size: Adult Regular)   Pulse 76   Temp 98.6  F (37  C) (Oral)   Resp 16   Ht 1.632 m (5' 4.25\")   Wt 66 kg (145 lb 9.6 oz)   SpO2 99%   Breastfeeding? No   BMI 24.80 kg/m    Body mass index is 24.8 kg/m .  GENERAL APPEARANCE: healthy, alert and no distress  RESP: lungs clear to auscultation - no rales, rhonchi or wheezes  CV: regular rates and rhythm and normal S1 S2, no S3 or S4  PSYCH: mentation appears normal. and affect normal/bright  No edema     Diagnostic Test Results:  Results for orders placed or performed in visit on 04/08/19   Basic metabolic panel   Result Value Ref Range    Sodium 140 133 - 144 mmol/L    Potassium 4.6 3.4 - 5.3 mmol/L    Chloride 104 94 - 109 mmol/L    Carbon Dioxide 27 20 - 32 mmol/L    Anion Gap 9 3 - 14 mmol/L    Glucose 92 70 - 99 mg/dL    Urea Nitrogen 16 7 - 30 mg/dL    Creatinine 0.67 0.52 - 1.04 mg/dL    GFR Estimate 81 >60 mL/min/[1.73_m2]    GFR Estimate If Black >90 >60 mL/min/[1.73_m2]    Calcium 10.7 (H) 8.5 - 10.1 mg/dL   CBC with platelets   Result Value Ref " Range    WBC 11.7 (H) 4.0 - 11.0 10e9/L    RBC Count 4.94 3.8 - 5.2 10e12/L    Hemoglobin 15.2 11.7 - 15.7 g/dL    Hematocrit 45.6 35.0 - 47.0 %    MCV 92 78 - 100 fl    MCH 30.8 26.5 - 33.0 pg    MCHC 33.3 31.5 - 36.5 g/dL    RDW 13.4 10.0 - 15.0 %    Platelet Count 347 150 - 450 10e9/L   *UA reflex to Microscopic and Culture ([place] [facility] (except [place] and [place])   Result Value Ref Range    Color Urine Yellow     Appearance Urine Slightly Cloudy     Glucose Urine Negative NEG^Negative mg/dL    Bilirubin Urine Negative NEG^Negative    Ketones Urine Negative NEG^Negative mg/dL    Specific Gravity Urine 1.020 1.003 - 1.035    Blood Urine Negative NEG^Negative    pH Urine 7.0 5.0 - 7.0 pH    Protein Albumin Urine Negative NEG^Negative mg/dL    Urobilinogen Urine 0.2 0.2 - 1.0 EU/dL    Nitrite Urine Negative NEG^Negative    Leukocyte Esterase Urine Small (A) NEG^Negative    Source Midstream Urine    Urine Microscopic   Result Value Ref Range    WBC Urine 5-10 (A) OTO5^0 - 5 /HPF    RBC Urine 2-5 (A) OTO2^O - 2 /HPF    Hyaline Casts O - 2 OTO2^O - 2 /LPF    Squamous Epithelial /LPF Urine Few FEW^Few /LPF    Amorphous Crystals Few (A) NEG^Negative /HPF   CBC with platelets and differential   Result Value Ref Range    WBC 11.7 (H) 4.0 - 11.0 10e9/L    RBC Count 4.94 3.8 - 5.2 10e12/L    Hemoglobin 15.2 11.7 - 15.7 g/dL    Hematocrit 45.6 35.0 - 47.0 %    MCV 92 78 - 100 fl    MCH 30.8 26.5 - 33.0 pg    MCHC 33.3 31.5 - 36.5 g/dL    RDW 13.4 10.0 - 15.0 %    Platelet Count 347 150 - 450 10e9/L    Diff Method Automated Method     % Neutrophils 58.0 %    % Lymphocytes 30.0 %    % Monocytes 9.0 %    % Eosinophils 3.0 %    Absolute Neutrophil 6.7 1.6 - 8.3 10e9/L    Absolute Lymphocytes 3.5 0.8 - 5.3 10e9/L    Absolute Monocytes 1.1 0.0 - 1.3 10e9/L    Absolute Eosinophils 0.4 0.0 - 0.7 10e9/L       ASSESSMENT/PLAN:             1. Essential hypertension  Per patient instructions.   - Basic metabolic  panel  - CBC with platelets  - Urine Microscopic    2. Hypertension goal BP (blood pressure) < 140/90  Amlodipine will be the next best option   - lisinopril (PRINIVIL/ZESTRIL) 40 MG tablet; Take 1 tablet (40 mg) by mouth daily  Dispense: 90 tablet; Refill: 1  - *UA reflex to Microscopic and Culture ([place] [facility] (except [place] and [place])    3. Elevated white blood cell count    - CBC with platelets and differential    Patient Instructions   Increase Lisinopril to 40 mg daily.    Continue to check your blood pressure at home.     Karla Hurt MD  [facility]

## 2019-04-09 DIAGNOSIS — D72.829 ELEVATED WHITE BLOOD CELL COUNT: Primary | ICD-10-CM

## 2019-04-09 LAB
ANION GAP SERPL CALCULATED.3IONS-SCNC: 9 MMOL/L (ref 3–14)
BUN SERPL-MCNC: 16 MG/DL (ref 7–30)
CALCIUM SERPL-MCNC: 10.7 MG/DL (ref 8.5–10.1)
CHLORIDE SERPL-SCNC: 104 MMOL/L (ref 94–109)
CO2 SERPL-SCNC: 27 MMOL/L (ref 20–32)
CREAT SERPL-MCNC: 0.67 MG/DL (ref 0.52–1.04)
DIFFERENTIAL METHOD BLD: ABNORMAL
EOSINOPHIL # BLD AUTO: 0.4 10E9/L (ref 0–0.7)
EOSINOPHIL NFR BLD AUTO: 3 %
ERYTHROCYTE [DISTWIDTH] IN BLOOD BY AUTOMATED COUNT: 13.4 % (ref 10–15)
GFR SERPL CREATININE-BSD FRML MDRD: 81 ML/MIN/{1.73_M2}
GLUCOSE SERPL-MCNC: 92 MG/DL (ref 70–99)
HCT VFR BLD AUTO: 45.6 % (ref 35–47)
HGB BLD-MCNC: 15.2 G/DL (ref 11.7–15.7)
LYMPHOCYTES # BLD AUTO: 3.5 10E9/L (ref 0.8–5.3)
LYMPHOCYTES NFR BLD AUTO: 30 %
MCH RBC QN AUTO: 30.8 PG (ref 26.5–33)
MCHC RBC AUTO-ENTMCNC: 33.3 G/DL (ref 31.5–36.5)
MCV RBC AUTO: 92 FL (ref 78–100)
MONOCYTES # BLD AUTO: 1.1 10E9/L (ref 0–1.3)
MONOCYTES NFR BLD AUTO: 9 %
NEUTROPHILS # BLD AUTO: 6.7 10E9/L (ref 1.6–8.3)
NEUTROPHILS NFR BLD AUTO: 58 %
PLATELET # BLD AUTO: 347 10E9/L (ref 150–450)
POTASSIUM SERPL-SCNC: 4.6 MMOL/L (ref 3.4–5.3)
RBC # BLD AUTO: 4.94 10E12/L (ref 3.8–5.2)
SODIUM SERPL-SCNC: 140 MMOL/L (ref 133–144)
WBC # BLD AUTO: 11.7 10E9/L (ref 4–11)

## 2019-04-10 ENCOUNTER — TELEPHONE (OUTPATIENT)
Dept: INTERNAL MEDICINE | Facility: CLINIC | Age: 84
End: 2019-04-10

## 2019-04-10 NOTE — RESULT ENCOUNTER NOTE
Normal electrolytes, other than a high calcium which is commonly seen with hydrochlorothiazide use. No calcium supplements are needed.  Normal kidney function. Slightly elevated white count but otherwise normal

## 2019-04-10 NOTE — TELEPHONE ENCOUNTER
Patient returned call to RN Hotline.  Spoke with patient & notified of provider's results as written.   Verbalizes understanding & no further questions.     Nadine Mesa RN

## 2019-04-10 NOTE — TELEPHONE ENCOUNTER
Notes recorded by Nadine Mesa RN on 4/10/2019 at 9:23 AM CDT  Left message for patient to call RN hotline [phone].   See TE.     Nadine Mesa RN    ------    Notes recorded by Karla Hurt MD on 4/9/2019 at 7:39 PM CDT  Normal electrolytes, other than a high calcium which is commonly seen with hydrochlorothiazide use. No calcium supplements are needed.  Normal kidney function. Slightly elevated white count but otherwise normal  ------    Notes recorded by Nadine Mesa RN on 4/9/2019 at 7:26 AM CDT  Left VM for lab with information. Advised to call RN Hotline back if possible or not.   Lab order placed.     Nadine Mesa RN  ------    Notes recorded by Karla Hurt MD on 4/8/2019 at 9:44 PM CDT  Can lab add a differential on the the CBC?  Indication - elevated white cell count    ,apr

## 2019-04-29 ENCOUNTER — OFFICE VISIT (OUTPATIENT)
Dept: FAMILY MEDICINE | Facility: CLINIC | Age: 84
End: 2019-04-29
Payer: MEDICARE

## 2019-04-29 VITALS
HEIGHT: 64 IN | WEIGHT: 144 LBS | DIASTOLIC BLOOD PRESSURE: 54 MMHG | TEMPERATURE: 97.7 F | HEART RATE: 82 BPM | SYSTOLIC BLOOD PRESSURE: 124 MMHG | RESPIRATION RATE: 18 BRPM | BODY MASS INDEX: 24.59 KG/M2 | OXYGEN SATURATION: 100 %

## 2019-04-29 DIAGNOSIS — I10 HYPERTENSION GOAL BP (BLOOD PRESSURE) < 140/90: Primary | ICD-10-CM

## 2019-04-29 PROCEDURE — 99213 OFFICE O/P EST LOW 20 MIN: CPT | Performed by: NURSE PRACTITIONER

## 2019-04-29 ASSESSMENT — PAIN SCALES - GENERAL: PAINLEVEL: NO PAIN (0)

## 2019-04-29 ASSESSMENT — MIFFLIN-ST. JEOR: SCORE: 1097.15

## 2019-04-29 NOTE — PROGRESS NOTES
SUBJECTIVE:   Lacey Bah is a 83 year old female who presents to clinic today for the following   health issues:      Hypertension Follow-up      Outpatient blood pressures are being checked at home.  Results are 122/61 to 149/75.    Low Salt Diet: low salt      Amount of exercise or physical activity: 2-3 days/week for an average of 30-45 minutes    Problems taking medications regularly: No    Medication side effects: none    Diet: low salt    Patient was recently started on lisinopril 40 mg daily.  She denies any side effects at higher dose.    Additional history: as documented    Reviewed  and updated as needed this visit by clinical staff         Reviewed and updated as needed this visit by Provider         Patient Active Problem List   Diagnosis     Hypertension goal BP (blood pressure) < 140/90     DCIS (ductal carcinoma in situ) of breast     HTN (hypertension)     Uterine disorder     Impaired fasting glucose     Hyperlipidemia LDL goal <130     Family history of colon cancer     Advanced directives, counseling/discussion     Tubular adenoma of colon     Skin exam, screening for cancer     Nontoxic multinodular goiter     Primary open angle glaucoma     Senile nuclear sclerosis     Past Surgical History:   Procedure Laterality Date     CATARACT IOL, RT/LT Left 04/15/2014     GLAUCOMA SURGERY Left 11/04/1997    trabulect     LASER SELECTIVE TRABECULOPLASTY  2008     SURGICAL HISTORY OF -   1964    THYROID NODULE      SURGICAL HISTORY OF -   2008    LUMPECTOMY       Social History     Tobacco Use     Smoking status: Never Smoker     Smokeless tobacco: Never Used   Substance Use Topics     Alcohol use: No     Family History   Problem Relation Age of Onset     Diabetes Mother      Glaucoma Mother      Cancer - colorectal Father      Cancer Sister         OVARIAN     Cancer Sister         pancreatic cancer     Diabetes Sister      Glaucoma Sister      Diabetes Sister      Glaucoma Sister      Diabetes  "Sister      Macular Degeneration No family hx of          Current Outpatient Medications   Medication Sig Dispense Refill     B COMPLEX OR 1 TABLET DAILY        FISH OIL 1000 MG OR CAPS 2 TABLET DAILY        hydrochlorothiazide (HYDRODIURIL) 25 MG tablet Take 1 tablet (25 mg) by mouth daily 90 tablet 11     latanoprost (XALATAN) 0.005 % ophthalmic solution Place 1 drop into both eyes At Bedtime RE 7.5 mL 3     lisinopril (PRINIVIL/ZESTRIL) 40 MG tablet Take 1 tablet (40 mg) by mouth daily 90 tablet 1     MULTI-VITAMIN OR 1 TABLET DAILY        timolol (TIMOPTIC) 0.5 % ophthalmic solution Place 1 drop into both eyes 2 times daily 30 mL 3     VITAMIN C OR 1 TABLET DAILY        VITAMIN D 1000 UNIT OR CAPS 1 CAPSULE DAILY       VITAMIN E OR 1 TABLET DAILY        Allergies   Allergen Reactions     Brimonidine Other (See Comments)     Other reaction(s): Erythema     BP Readings from Last 3 Encounters:   04/29/19 124/54   04/08/19 200/90   10/03/18 148/82    Wt Readings from Last 3 Encounters:   04/29/19 65.3 kg (144 lb)   04/08/19 66 kg (145 lb 9.6 oz)   10/03/18 67.3 kg (148 lb 6.4 oz)                  Labs reviewed in EPIC    ROS:  Constitutional, HEENT, cardiovascular, pulmonary, gi and gu systems are negative, except as otherwise noted.    OBJECTIVE:     /54   Pulse 82   Temp 97.7  F (36.5  C) (Oral)   Resp 18   Ht 1.632 m (5' 4.25\")   Wt 65.3 kg (144 lb)   SpO2 100%   BMI 24.53 kg/m    Body mass index is 24.53 kg/m .  GENERAL: healthy, alert and no distress  RESP: lungs clear to auscultation - no rales, rhonchi or wheezes  CV: regular rate and rhythm, normal S1 S2, no S3 or S4, no murmur, click or rub, no peripheral edema and peripheral pulses strong  MS: no gross musculoskeletal defects noted, no edema    Diagnostic Test Results:  none     ASSESSMENT/PLAN:     1. Hypertension goal BP (blood pressure) < 140/90  Stable.  Continue current treatment plan and medications.        FUTURE APPOINTMENTS:       - " Follow-up for annual visit or as needed    MARCELLE Borrero CNP  [facility]

## 2019-06-08 ENCOUNTER — OFFICE VISIT (OUTPATIENT)
Dept: URGENT CARE | Facility: URGENT CARE | Age: 84
End: 2019-06-08
Payer: MEDICARE

## 2019-06-08 VITALS
TEMPERATURE: 98.1 F | BODY MASS INDEX: 23.5 KG/M2 | DIASTOLIC BLOOD PRESSURE: 80 MMHG | HEART RATE: 92 BPM | WEIGHT: 138 LBS | SYSTOLIC BLOOD PRESSURE: 152 MMHG | RESPIRATION RATE: 13 BRPM

## 2019-06-08 DIAGNOSIS — N30.00 ACUTE CYSTITIS WITHOUT HEMATURIA: Primary | ICD-10-CM

## 2019-06-08 DIAGNOSIS — R82.90 NONSPECIFIC FINDING ON EXAMINATION OF URINE: ICD-10-CM

## 2019-06-08 LAB
ALBUMIN UR-MCNC: 100 MG/DL
APPEARANCE UR: ABNORMAL
BACTERIA #/AREA URNS HPF: ABNORMAL /HPF
BILIRUB UR QL STRIP: ABNORMAL
COLOR UR AUTO: YELLOW
GLUCOSE UR STRIP-MCNC: NEGATIVE MG/DL
HGB UR QL STRIP: ABNORMAL
KETONES UR STRIP-MCNC: ABNORMAL MG/DL
LEUKOCYTE ESTERASE UR QL STRIP: ABNORMAL
NITRATE UR QL: NEGATIVE
NON-SQ EPI CELLS #/AREA URNS LPF: ABNORMAL /LPF
PH UR STRIP: 7.5 PH (ref 5–7)
RBC #/AREA URNS AUTO: ABNORMAL /HPF
RENAL EPI CELLS #/AREA URNS HPF: ABNORMAL /HPF
SOURCE: ABNORMAL
SP GR UR STRIP: 1.01 (ref 1–1.03)
UROBILINOGEN UR STRIP-ACNC: 2 EU/DL (ref 0.2–1)
WBC #/AREA URNS AUTO: >100 /HPF

## 2019-06-08 PROCEDURE — 87086 URINE CULTURE/COLONY COUNT: CPT | Performed by: NURSE PRACTITIONER

## 2019-06-08 PROCEDURE — 99213 OFFICE O/P EST LOW 20 MIN: CPT | Performed by: NURSE PRACTITIONER

## 2019-06-08 PROCEDURE — 81001 URINALYSIS AUTO W/SCOPE: CPT | Performed by: NURSE PRACTITIONER

## 2019-06-08 RX ORDER — NITROFURANTOIN 25; 75 MG/1; MG/1
100 CAPSULE ORAL 2 TIMES DAILY
Qty: 10 CAPSULE | Refills: 0 | Status: SHIPPED | OUTPATIENT
Start: 2019-06-08 | End: 2019-06-13

## 2019-06-08 NOTE — PROGRESS NOTES
SUBJECTIVE:   Lacey Bah is a 83 year old female who  presents today for a possible UTI. Symptoms of dysuria, urgency and frequency have been going on for 2day(s).  Hematuria no.  sudden onset and moderate. There is no history of fever, chills, nausea or vomiting.  No history of vaginal discharge. This patient does have have a history of urinary tract infections, nothing recent. Patient denies long duration, rigors, flank pain, temperature > 101 degrees F., Vomiting, significant nausea or diarrhea, taking Coumadin and GFR less than 30 within the last year    Past Medical History:   Diagnosis Date     DCIS (ductal carcinoma in situ) of breast 2007    s/p lumpectomy , radiation - needs yearly us and twice yearly office exams per oncologist Dr. Diehl     Glaucoma     right eye      HTN (hypertension)     beta blockers made her fatigued, white coat htn     Nonsenile cataract      Thyroid nodule     saw Stesin 9/08-us due in 9/09 by him     Tubular adenoma of colon 10/31/2012     Uterine disorder     thickened lining - biopsy 2008 negative     Current Outpatient Medications   Medication Sig Dispense Refill     B COMPLEX OR 1 TABLET DAILY        FISH OIL 1000 MG OR CAPS 2 TABLET DAILY        hydrochlorothiazide (HYDRODIURIL) 25 MG tablet Take 1 tablet (25 mg) by mouth daily 90 tablet 11     latanoprost (XALATAN) 0.005 % ophthalmic solution Place 1 drop into both eyes At Bedtime RE 7.5 mL 3     MULTI-VITAMIN OR 1 TABLET DAILY        timolol (TIMOPTIC) 0.5 % ophthalmic solution Place 1 drop into both eyes 2 times daily 30 mL 3     VITAMIN C OR 1 TABLET DAILY        VITAMIN D 1000 UNIT OR CAPS 1 CAPSULE DAILY       VITAMIN E OR 1 TABLET DAILY        lisinopril (PRINIVIL/ZESTRIL) 40 MG tablet Take 1 tablet (40 mg) by mouth daily 90 tablet 1     Social History     Tobacco Use     Smoking status: Never Smoker     Smokeless tobacco: Never Used   Substance Use Topics     Alcohol use: No       ROS:   Review of systems  negative except as stated above.    OBJECTIVE:  /74   Pulse 105   Temp 98.1  F (36.7  C)   Resp 13   Wt 62.6 kg (138 lb)   BMI 23.50 kg/m    GENERAL APPEARANCE: healthy, alert and no distress  RESP: lungs clear to auscultation - no rales, rhonchi or wheezes  CV: regular rates and rhythm, normal S1 S2, no murmur noted  ABDOMEN:  soft, nontender, no HSM or masses and bowel sounds normal  BACK: No CVA tenderness  SKIN: no suspicious lesions or rashes    ASSESSMENT:   Lower, uncomplicated urinary tract infection.    PLAN:  Macrobid as directed (ok kidney function)  Drink plenty of fluids.  Prevention and treatment of UTI's discussed.Signs and symptoms of pyelonephritis mentioned.  Follow up with primary care physician if not improving    MARCELLE Zhang CNP

## 2019-06-09 LAB
BACTERIA SPEC CULT: NORMAL
BACTERIA SPEC CULT: NORMAL
SPECIMEN SOURCE: NORMAL

## 2019-07-01 ENCOUNTER — ANCILLARY PROCEDURE (OUTPATIENT)
Dept: GENERAL RADIOLOGY | Facility: CLINIC | Age: 84
End: 2019-07-01
Attending: INTERNAL MEDICINE
Payer: MEDICARE

## 2019-07-01 ENCOUNTER — OFFICE VISIT (OUTPATIENT)
Dept: INTERNAL MEDICINE | Facility: CLINIC | Age: 84
End: 2019-07-01
Payer: MEDICARE

## 2019-07-01 VITALS
TEMPERATURE: 97.3 F | HEART RATE: 92 BPM | BODY MASS INDEX: 22.94 KG/M2 | DIASTOLIC BLOOD PRESSURE: 78 MMHG | HEIGHT: 64 IN | RESPIRATION RATE: 17 BRPM | WEIGHT: 134.4 LBS | OXYGEN SATURATION: 100 % | SYSTOLIC BLOOD PRESSURE: 144 MMHG

## 2019-07-01 DIAGNOSIS — D72.829 LEUKOCYTOSIS, UNSPECIFIED TYPE: ICD-10-CM

## 2019-07-01 DIAGNOSIS — D64.9 ANEMIA, UNSPECIFIED TYPE: ICD-10-CM

## 2019-07-01 DIAGNOSIS — M02.30 REACTIVE ARTHRITIS (H): Primary | ICD-10-CM

## 2019-07-01 DIAGNOSIS — I10 HYPERTENSION GOAL BP (BLOOD PRESSURE) < 140/90: ICD-10-CM

## 2019-07-01 DIAGNOSIS — M79.89 SWELLING OF TOE OF LEFT FOOT: ICD-10-CM

## 2019-07-01 LAB — ERYTHROCYTE [SEDIMENTATION RATE] IN BLOOD BY WESTERGREN METHOD: 40 MM/H (ref 0–30)

## 2019-07-01 PROCEDURE — 85025 COMPLETE CBC W/AUTO DIFF WBC: CPT | Performed by: INTERNAL MEDICINE

## 2019-07-01 PROCEDURE — 85060 BLOOD SMEAR INTERPRETATION: CPT | Performed by: INTERNAL MEDICINE

## 2019-07-01 PROCEDURE — 82607 VITAMIN B-12: CPT | Performed by: INTERNAL MEDICINE

## 2019-07-01 PROCEDURE — 83550 IRON BINDING TEST: CPT | Performed by: INTERNAL MEDICINE

## 2019-07-01 PROCEDURE — 85045 AUTOMATED RETICULOCYTE COUNT: CPT | Performed by: INTERNAL MEDICINE

## 2019-07-01 PROCEDURE — 83540 ASSAY OF IRON: CPT | Performed by: INTERNAL MEDICINE

## 2019-07-01 PROCEDURE — 36415 COLL VENOUS BLD VENIPUNCTURE: CPT | Performed by: INTERNAL MEDICINE

## 2019-07-01 PROCEDURE — 73630 X-RAY EXAM OF FOOT: CPT | Mod: LT

## 2019-07-01 PROCEDURE — 99214 OFFICE O/P EST MOD 30 MIN: CPT | Performed by: INTERNAL MEDICINE

## 2019-07-01 PROCEDURE — 82746 ASSAY OF FOLIC ACID SERUM: CPT | Performed by: INTERNAL MEDICINE

## 2019-07-01 PROCEDURE — 82728 ASSAY OF FERRITIN: CPT | Performed by: INTERNAL MEDICINE

## 2019-07-01 PROCEDURE — 83010 ASSAY OF HAPTOGLOBIN QUANT: CPT | Performed by: INTERNAL MEDICINE

## 2019-07-01 PROCEDURE — 86140 C-REACTIVE PROTEIN: CPT | Performed by: INTERNAL MEDICINE

## 2019-07-01 PROCEDURE — 80053 COMPREHEN METABOLIC PANEL: CPT | Performed by: INTERNAL MEDICINE

## 2019-07-01 PROCEDURE — 85652 RBC SED RATE AUTOMATED: CPT | Performed by: INTERNAL MEDICINE

## 2019-07-01 RX ORDER — LISINOPRIL 20 MG/1
20 TABLET ORAL DAILY
Qty: 90 TABLET | Refills: 3 | Status: SHIPPED | OUTPATIENT
Start: 2019-07-01 | End: 2019-07-10

## 2019-07-01 ASSESSMENT — MIFFLIN-ST. JEOR: SCORE: 1053.6

## 2019-07-01 ASSESSMENT — PAIN SCALES - GENERAL: PAINLEVEL: NO PAIN (0)

## 2019-07-01 NOTE — PROGRESS NOTES
"Subjective     Lacey Bah is a 83 year old female who presents to clinic today for the following health issues:    Blood pressure is in the 120-140 systolic with 60-70 diastolic, pulses 70-90's pulses. She is down on the lisinopril to 20 mg as the 40 mg made her ears ring.   Patient denies any exertional chest pain, dyspnea, palpitations, syncope, orthopnea, edema or paroxysmal nocturnal dyspnea.       Patient is here today for Blood Pressure Recheck and also wants to discuss the infection in her toe. She was seen in urgent care for this.     History of Present Illness        Hyperlipidemia:  She presents for follow up of hyperlipidemia.  She is not taking medication to lower cholesterol. She is not having myalgia or other side effects to statin medications.She is not reporting shortness of breath, increased sweating or nausea with activity, left-sided neck or arm pain, chest pain or pressure, or pain in calves when walking 1-2 blocks.    Hypertension: She presents for follow up of hypertension.  She does check blood pressure  regularly outside of the clinic. Outside blood pressures have been over 140/90. She follows a low salt diet.     She eats 2-3 servings of fruits and vegetables daily.She consumes 0 sweetened beverage(s) daily.  She is taking medications regularly.     Was given doxycycline for cellulitis of the left foot.  She was found to have gout as well and was given prednisone.  Leukocytosis found and recommended to have this rechecked today.              Reviewed and updated as needed this visit by Provider  Tobacco  Allergies  Meds  Problems  Med Hx  Surg Hx  Fam Hx         Review of Systems    ROS: 10 point ROS neg other than the symptoms noted above in the HPI.       Objective    /78 (BP Location: Left arm, Cuff Size: Adult Regular)   Pulse 92   Temp 97.3  F (36.3  C) (Oral)   Resp 17   Ht 1.632 m (5' 4.25\")   Wt 61 kg (134 lb 6.4 oz)   SpO2 100%   BMI 22.89 kg/m    Body mass " index is 22.89 kg/m .  Physical Exam   GENERAL APPEARANCE: healthy, alert and no distress  NECK: no adenopathy, no asymmetry, masses, or scars and thyroid normal to palpation  RESP: lungs clear to auscultation - no rales, rhonchi or wheezes  CV: regular rates and rhythm and normal S1 S2, no S3 or S4  ABDOMEN:  soft, nontender, no HSM or masses and bowel sounds normal  SKIN: no suspicious lesions or rashes  PSYCH: mentation appears normal. and affect normal/bright  No edema   Right big toe and the second and fourth toes on the left are discolored and swollen but not tender.  No drainage or open sores.    1+ posterior tibial pulses bilateral          Diagnostic Test Results:  Labs reviewed in Epic  Results for orders placed or performed in visit on 07/01/19   XR Foot Left G/E 3 Views    Narrative    LEFT FOOT THREE OR MORE VIEWS    7/1/2019 6:35 PM     HISTORY:  Swelling of toe of left foot.    COMPARISON: None.    FINDINGS: Hallux valgus, lateral subluxation of the sesamoids, bunion  and moderate osteoarthrosis of the first MTP joint. There is soft  tissue calcification along the lateral aspect of the third, fourth and  fifth toes, particularly at the level of the DIP joint of the fourth  toe. No erosions. No periarticular osteopenia or periostitis. There is  soft tissue swelling lateral to the PIP joint of the fourth toe.  Otherwise negative.      Impression    IMPRESSION:  1. Hallux valgus, lateral subluxation of the sesamoids, bunion and  moderate osteoarthrosis of the first MTP joint.  2. Soft tissue calcification in the toes which may be related to old  trauma, but gout should also be considered, particularly given the  adjacent soft tissue swelling.      AYDEE LOMBARDI MD   CBC with platelets differential   Result Value Ref Range    WBC 13.0 (H) 4.0 - 11.0 10e9/L    RBC Count 4.42 3.8 - 5.2 10e12/L    Hemoglobin 13.0 11.7 - 15.7 g/dL    Hematocrit 39.8 35.0 - 47.0 %    MCV 90 78 - 100 fl    MCH 29.4 26.5 -  33.0 pg    MCHC 32.7 31.5 - 36.5 g/dL    RDW 12.9 10.0 - 15.0 %    Platelet Count 395 150 - 450 10e9/L    Diff Method PENDING    Reticulocyte Count   Result Value Ref Range    % Retic 1.5 0.5 - 2.0 %    Absolute Retic 64.5 25 - 95 10e9/L   Iron and iron binding capacity   Result Value Ref Range    Iron 26 (L) 35 - 180 ug/dL    Iron Binding Cap 290 240 - 430 ug/dL    Iron Saturation Index 9 (L) 15 - 46 %   Vitamin B12   Result Value Ref Range    Vitamin B12 1,171 (H) 193 - 986 pg/mL   Folate   Result Value Ref Range    Folate 38.8 >5.4 ng/mL   Haptoglobin   Result Value Ref Range    Haptoglobin 281 (H) 35 - 175 mg/dL   Ferritin   Result Value Ref Range    Ferritin 467 (H) 8 - 252 ng/mL   Comprehensive metabolic panel   Result Value Ref Range    Sodium 139 133 - 144 mmol/L    Potassium 4.3 3.4 - 5.3 mmol/L    Chloride 104 94 - 109 mmol/L    Carbon Dioxide 29 20 - 32 mmol/L    Anion Gap 6 3 - 14 mmol/L    Glucose 81 70 - 99 mg/dL    Urea Nitrogen 17 7 - 30 mg/dL    Creatinine 0.71 0.52 - 1.04 mg/dL    GFR Estimate 79 >60 mL/min/[1.73_m2]    GFR Estimate If Black >90 >60 mL/min/[1.73_m2]    Calcium 9.8 8.5 - 10.1 mg/dL    Bilirubin Total 0.4 0.2 - 1.3 mg/dL    Albumin 3.3 (L) 3.4 - 5.0 g/dL    Protein Total 7.8 6.8 - 8.8 g/dL    Alkaline Phosphatase 80 40 - 150 U/L    ALT 33 0 - 50 U/L    AST 20 0 - 45 U/L   Erythrocyte sedimentation rate auto   Result Value Ref Range    Sed Rate 40 (H) 0 - 30 mm/h   CRP inflammation   Result Value Ref Range    CRP Inflammation 13.4 (H) 0.0 - 8.0 mg/L           Assessment & Plan     1. Reactive arthritis (H)  Unclear etiology.  The elevated white count (even before prednisone use) is concerning for a secondary process.  Xray with calcium deposition which makes CPPD more likely.  Did respond with prednisone so if it flares again during the workup process would retreat with prednisone     2. Anemia, unspecified type  Unclear etiology needs further workup   - Blood Morphology Pathologist  Review  - CBC with platelets differential  - Reticulocyte Count  - Iron and iron binding capacity  - Vitamin B12  - Folate  - Haptoglobin  - Ferritin  - Comprehensive metabolic panel  - Erythrocyte sedimentation rate auto  - CRP inflammation    3. Leukocytosis, unspecified type    - Blood Morphology Pathologist Review  - CBC with platelets differential  - Reticulocyte Count  - Iron and iron binding capacity  - Vitamin B12  - Folate  - Haptoglobin  - Ferritin  - Comprehensive metabolic panel  - Erythrocyte sedimentation rate auto  - CRP inflammation    4. Swelling of toe of left foot    - XR Foot Left G/E 3 Views    5. Hypertension goal BP (blood pressure) < 140/90  Per patient instructions. Has white coat hypertension   - lisinopril (PRINIVIL/ZESTRIL) 20 MG tablet; Take 1 tablet (20 mg) by mouth daily  Dispense: 90 tablet; Refill: 3       There are no Patient Instructions on file for this visit.     Return in about 1 week (around 7/8/2019), or if symptoms worsen or fail to improve.    Karla Hurt MD  [facility]

## 2019-07-02 LAB
ALBUMIN SERPL-MCNC: 3.3 G/DL (ref 3.4–5)
ALP SERPL-CCNC: 80 U/L (ref 40–150)
ALT SERPL W P-5'-P-CCNC: 33 U/L (ref 0–50)
ANION GAP SERPL CALCULATED.3IONS-SCNC: 6 MMOL/L (ref 3–14)
AST SERPL W P-5'-P-CCNC: 20 U/L (ref 0–45)
BILIRUB SERPL-MCNC: 0.4 MG/DL (ref 0.2–1.3)
BUN SERPL-MCNC: 17 MG/DL (ref 7–30)
CALCIUM SERPL-MCNC: 9.8 MG/DL (ref 8.5–10.1)
CHLORIDE SERPL-SCNC: 104 MMOL/L (ref 94–109)
CO2 SERPL-SCNC: 29 MMOL/L (ref 20–32)
CREAT SERPL-MCNC: 0.71 MG/DL (ref 0.52–1.04)
CRP SERPL-MCNC: 13.4 MG/L (ref 0–8)
FERRITIN SERPL-MCNC: 467 NG/ML (ref 8–252)
FOLATE SERPL-MCNC: 38.8 NG/ML
GFR SERPL CREATININE-BSD FRML MDRD: 79 ML/MIN/{1.73_M2}
GLUCOSE SERPL-MCNC: 81 MG/DL (ref 70–99)
HAPTOGLOB SERPL-MCNC: 281 MG/DL (ref 35–175)
IRON SATN MFR SERPL: 9 % (ref 15–46)
IRON SERPL-MCNC: 26 UG/DL (ref 35–180)
POTASSIUM SERPL-SCNC: 4.3 MMOL/L (ref 3.4–5.3)
PROT SERPL-MCNC: 7.8 G/DL (ref 6.8–8.8)
RETICS # AUTO: 64.5 10E9/L (ref 25–95)
RETICS/RBC NFR AUTO: 1.5 % (ref 0.5–2)
SODIUM SERPL-SCNC: 139 MMOL/L (ref 133–144)
TIBC SERPL-MCNC: 290 UG/DL (ref 240–430)
VIT B12 SERPL-MCNC: 1171 PG/ML (ref 193–986)

## 2019-07-02 NOTE — RESULT ENCOUNTER NOTE
The xray shows arthritis and also the appearance of calcium deposits in the toes which is commonly seen with gout.

## 2019-07-02 NOTE — RESULT ENCOUNTER NOTE
Normal folate and B12 level.  Iron levels are borderline.  Normal kidney function. Normal electrolytes. Normal liver blood test. Other inflammation tests are elevated as well.  The rest of your labs are still pending.

## 2019-07-03 PROBLEM — D64.9 ANEMIA, UNSPECIFIED TYPE: Status: ACTIVE | Noted: 2019-07-03

## 2019-07-03 PROBLEM — M02.30 REACTIVE ARTHRITIS (H): Status: ACTIVE | Noted: 2019-07-03

## 2019-07-03 LAB — COPATH REPORT: NORMAL

## 2019-07-05 ENCOUNTER — NURSE TRIAGE (OUTPATIENT)
Dept: NURSING | Facility: CLINIC | Age: 84
End: 2019-07-05

## 2019-07-05 ENCOUNTER — TELEPHONE (OUTPATIENT)
Dept: INTERNAL MEDICINE | Facility: CLINIC | Age: 84
End: 2019-07-05

## 2019-07-05 LAB
DIFFERENTIAL METHOD BLD: ABNORMAL
EOSINOPHIL # BLD AUTO: 0.1 10E9/L (ref 0–0.7)
EOSINOPHIL NFR BLD AUTO: 1 %
ERYTHROCYTE [DISTWIDTH] IN BLOOD BY AUTOMATED COUNT: 12.9 % (ref 10–15)
HCT VFR BLD AUTO: 39.8 % (ref 35–47)
HGB BLD-MCNC: 13 G/DL (ref 11.7–15.7)
LYMPHOCYTES # BLD AUTO: 3.1 10E9/L (ref 0.8–5.3)
LYMPHOCYTES NFR BLD AUTO: 24 %
MCH RBC QN AUTO: 29.4 PG (ref 26.5–33)
MCHC RBC AUTO-ENTMCNC: 32.7 G/DL (ref 31.5–36.5)
MCV RBC AUTO: 90 FL (ref 78–100)
MONOCYTES # BLD AUTO: 0.5 10E9/L (ref 0–1.3)
MONOCYTES NFR BLD AUTO: 4 %
NEUTROPHILS # BLD AUTO: 9.3 10E9/L (ref 1.6–8.3)
NEUTROPHILS NFR BLD AUTO: 71 %
PLATELET # BLD AUTO: 395 10E9/L (ref 150–450)
PLATELET # BLD EST: ABNORMAL 10*3/UL
RBC # BLD AUTO: 4.42 10E12/L (ref 3.8–5.2)
WBC # BLD AUTO: 13 10E9/L (ref 4–11)

## 2019-07-05 NOTE — TELEPHONE ENCOUNTER
Patient unable to view results of CBC with platelets differential in MyChart and would like results.  Routed to PCP Pool.

## 2019-07-05 NOTE — TELEPHONE ENCOUNTER
Called and spoke with patient and advised her CBC has not yet been reviewed by PCP and would be posted to [facility] once reviewed. Patient verbalized understanding & no further questions.    Nadine Mesa RN

## 2019-07-05 NOTE — TELEPHONE ENCOUNTER
Clinic Action Needed:  Please contact patient at [phone].  Reason for Call:  Patient unable to view results of CBC with platelets differential in MyChart and would like results.  Routed to:  PCP Pool    Please close encounter when completed.

## 2019-07-10 ENCOUNTER — OFFICE VISIT (OUTPATIENT)
Dept: INTERNAL MEDICINE | Facility: CLINIC | Age: 84
End: 2019-07-10
Payer: MEDICARE

## 2019-07-10 VITALS
TEMPERATURE: 97.1 F | WEIGHT: 133.8 LBS | SYSTOLIC BLOOD PRESSURE: 138 MMHG | BODY MASS INDEX: 22.84 KG/M2 | HEIGHT: 64 IN | HEART RATE: 89 BPM | DIASTOLIC BLOOD PRESSURE: 78 MMHG | RESPIRATION RATE: 18 BRPM | OXYGEN SATURATION: 100 %

## 2019-07-10 DIAGNOSIS — I10 HYPERTENSION GOAL BP (BLOOD PRESSURE) < 140/90: ICD-10-CM

## 2019-07-10 DIAGNOSIS — M11.89 PSEUDOGOUT INVOLVING MULTIPLE JOINTS: Primary | ICD-10-CM

## 2019-07-10 PROCEDURE — 99213 OFFICE O/P EST LOW 20 MIN: CPT | Performed by: INTERNAL MEDICINE

## 2019-07-10 RX ORDER — LOSARTAN POTASSIUM 100 MG/1
100 TABLET ORAL DAILY
Qty: 30 TABLET | Refills: 3 | Status: SHIPPED | OUTPATIENT
Start: 2019-07-10 | End: 2019-09-30

## 2019-07-10 RX ORDER — PREDNISONE 10 MG/1
10 TABLET ORAL DAILY
Qty: 5 TABLET | Refills: 0 | Status: SHIPPED | OUTPATIENT
Start: 2019-07-10 | End: 2019-07-15

## 2019-07-10 ASSESSMENT — MIFFLIN-ST. JEOR: SCORE: 1050.88

## 2019-07-10 ASSESSMENT — PAIN SCALES - GENERAL: PAINLEVEL: NO PAIN (0)

## 2019-07-10 NOTE — PROGRESS NOTES
Subjective     Lacey Bah is a 83 year old female who presents to clinic today for the following health issues:    Patient is here today to follow up for the infection in her toe from 7/1/2019. Patient states    it is getting better.  Toes are still swollen but not painful.  Otherwise feels well.  No fever/chills or night sweats.      Labs consistent with inflammation and xray with CPPD        Patient Active Problem List   Diagnosis     Hypertension goal BP (blood pressure) < 140/90     DCIS (ductal carcinoma in situ) of breast     HTN (hypertension)     Uterine disorder     Impaired fasting glucose     Hyperlipidemia LDL goal <130     Family history of colon cancer     Advanced directives, counseling/discussion     Tubular adenoma of colon     Skin exam, screening for cancer     Nontoxic multinodular goiter     Primary open angle glaucoma     Senile nuclear sclerosis     Reactive arthritis (H)     Anemia, unspecified type     Pseudogout involving multiple joints     Past Surgical History:   Procedure Laterality Date     CATARACT IOL, RT/LT Left 04/15/2014     GLAUCOMA SURGERY Left 11/04/1997    trabulect     LASER SELECTIVE TRABECULOPLASTY  2008     SURGICAL HISTORY OF -   1964    THYROID NODULE      SURGICAL HISTORY OF -   2008    LUMPECTOMY       Social History     Tobacco Use     Smoking status: Never Smoker     Smokeless tobacco: Never Used   Substance Use Topics     Alcohol use: No     Family History   Problem Relation Age of Onset     Diabetes Mother      Glaucoma Mother      Cancer - colorectal Father      Cancer Sister         OVARIAN     Cancer Sister         pancreatic cancer     Diabetes Sister      Glaucoma Sister      Diabetes Sister      Glaucoma Sister      Diabetes Sister      Macular Degeneration No family hx of          Current Outpatient Medications   Medication Sig Dispense Refill     B COMPLEX OR 1 TABLET DAILY        FISH OIL 1000 MG OR CAPS 2 TABLET DAILY        latanoprost (XALATAN)  "0.005 % ophthalmic solution Place 1 drop into both eyes At Bedtime RE 7.5 mL 3     losartan (COZAAR) 100 MG tablet Take 1 tablet (100 mg) by mouth daily 30 tablet 3     MULTI-VITAMIN OR 1 TABLET DAILY        predniSONE (DELTASONE) 10 MG tablet Take 1 tablet (10 mg) by mouth daily for 5 days 5 tablet 0     timolol (TIMOPTIC) 0.5 % ophthalmic solution Place 1 drop into both eyes 2 times daily 30 mL 3     VITAMIN C OR 1 TABLET DAILY        VITAMIN D 1000 UNIT OR CAPS 1 CAPSULE DAILY       VITAMIN E OR 1 TABLET DAILY          Reviewed and updated as needed this visit by Provider  Tobacco  Allergies  Meds  Problems  Med Hx  Surg Hx  Fam Hx         Review of Systems    ROS: 4 point ROS neg other than the symptoms noted above in the HPI.        Objective    /78   Pulse 89   Temp 97.1  F (36.2  C) (Oral)   Resp 18   Ht 1.632 m (5' 4.25\")   Wt 60.7 kg (133 lb 12.8 oz)   SpO2 100%   BMI 22.79 kg/m    Body mass index is 22.79 kg/m .  Physical Exam   No acute distress.  Toes on the left foot with persistent swelling but minimal pain with palpation of the 2nd and 4th digits     Diagnostic Test Results:  Labs reviewed in Epic        Assessment & Plan     1. Pseudogout involving multiple joints  Per patient instructions. BMP and PTH at follow up appointment   - losartan (COZAAR) 100 MG tablet; Take 1 tablet (100 mg) by mouth daily  Dispense: 30 tablet; Refill: 3  - predniSONE (DELTASONE) 10 MG tablet; Take 1 tablet (10 mg) by mouth daily for 5 days  Dispense: 5 tablet; Refill: 0    2. Hypertension goal BP (blood pressure) < 140/90  Per patient instructions.   - losartan (COZAAR) 100 MG tablet; Take 1 tablet (100 mg) by mouth daily  Dispense: 30 tablet; Refill: 3       Patient Instructions   Stop hydrochlorothiazide   Stop Lisinopril  Start Losartan 100 mg daily.  This is for blood pressure   Prednisone prescription only for a flare of the pseudogout.  Next step would be to use colchicine 0.6 mg daily. "     Return in about 3 weeks (around 7/31/2019) for Medication check and blood work.    Karla Hurt MD  [facility]

## 2019-07-10 NOTE — PATIENT INSTRUCTIONS
Stop hydrochlorothiazide   Stop Lisinopril  Start Losartan 100 mg daily.  This is for blood pressure   Prednisone prescription only for a flare of the pseudogout.  Next step would be to use colchicine 0.6 mg daily.

## 2019-08-12 ENCOUNTER — OFFICE VISIT (OUTPATIENT)
Dept: INTERNAL MEDICINE | Facility: CLINIC | Age: 84
End: 2019-08-12
Payer: MEDICARE

## 2019-08-12 VITALS
RESPIRATION RATE: 13 BRPM | WEIGHT: 135.6 LBS | DIASTOLIC BLOOD PRESSURE: 82 MMHG | SYSTOLIC BLOOD PRESSURE: 170 MMHG | HEIGHT: 64 IN | HEART RATE: 80 BPM | TEMPERATURE: 98.1 F | BODY MASS INDEX: 23.15 KG/M2 | OXYGEN SATURATION: 99 %

## 2019-08-12 DIAGNOSIS — M11.89 PSEUDOGOUT INVOLVING MULTIPLE JOINTS: ICD-10-CM

## 2019-08-12 DIAGNOSIS — I10 HYPERTENSION GOAL BP (BLOOD PRESSURE) < 140/90: Primary | ICD-10-CM

## 2019-08-12 LAB
ANION GAP SERPL CALCULATED.3IONS-SCNC: 5 MMOL/L (ref 3–14)
BUN SERPL-MCNC: 11 MG/DL (ref 7–30)
CALCIUM SERPL-MCNC: 9.7 MG/DL (ref 8.5–10.1)
CHLORIDE SERPL-SCNC: 109 MMOL/L (ref 94–109)
CO2 SERPL-SCNC: 26 MMOL/L (ref 20–32)
CREAT SERPL-MCNC: 0.65 MG/DL (ref 0.52–1.04)
GFR SERPL CREATININE-BSD FRML MDRD: 82 ML/MIN/{1.73_M2}
GLUCOSE SERPL-MCNC: 97 MG/DL (ref 70–99)
POTASSIUM SERPL-SCNC: 3.9 MMOL/L (ref 3.4–5.3)
PTH-INTACT SERPL-MCNC: 44 PG/ML (ref 18–80)
SODIUM SERPL-SCNC: 140 MMOL/L (ref 133–144)

## 2019-08-12 PROCEDURE — 80048 BASIC METABOLIC PNL TOTAL CA: CPT | Performed by: INTERNAL MEDICINE

## 2019-08-12 PROCEDURE — 83970 ASSAY OF PARATHORMONE: CPT | Performed by: INTERNAL MEDICINE

## 2019-08-12 PROCEDURE — 99213 OFFICE O/P EST LOW 20 MIN: CPT | Performed by: INTERNAL MEDICINE

## 2019-08-12 PROCEDURE — 36415 COLL VENOUS BLD VENIPUNCTURE: CPT | Performed by: INTERNAL MEDICINE

## 2019-08-12 RX ORDER — AMLODIPINE BESYLATE 5 MG/1
5 TABLET ORAL DAILY
Qty: 30 TABLET | Refills: 1 | Status: SHIPPED | OUTPATIENT
Start: 2019-08-12 | End: 2019-09-30

## 2019-08-12 ASSESSMENT — MIFFLIN-ST. JEOR: SCORE: 1059.05

## 2019-08-12 ASSESSMENT — PAIN SCALES - GENERAL: PAINLEVEL: NO PAIN (0)

## 2019-08-12 NOTE — PATIENT INSTRUCTIONS
Start Amlodipine 5 mg daily for blood pressure.  Call or MyChart in 2-3 weeks with your average blood pressure and pulse.

## 2019-08-12 NOTE — PROGRESS NOTES
Subjective     Lacey Bah is a 83 year old female who presents to clinic today for the following health issues:    HPI   Blood Pressure  Patient notes that her systolic blood pressure ranges around 178-180 and diastolic blood pressure ranges around 67-78. Patient denies any side effects to her medications. She reports some discoloration to her 2nd and 4th toe but denies any pain.    She stopped her hydrochlorothiazide and increased her losartan to 100 mg       Patient Active Problem List   Diagnosis     Hypertension goal BP (blood pressure) < 140/90     DCIS (ductal carcinoma in situ) of breast     HTN (hypertension)     Uterine disorder     Impaired fasting glucose     Hyperlipidemia LDL goal <130     Family history of colon cancer     Advanced directives, counseling/discussion     Tubular adenoma of colon     Skin exam, screening for cancer     Nontoxic multinodular goiter     Primary open angle glaucoma     Senile nuclear sclerosis     Reactive arthritis (H)     Anemia, unspecified type     Pseudogout involving multiple joints     Past Surgical History:   Procedure Laterality Date     CATARACT IOL, RT/LT Left 04/15/2014     GLAUCOMA SURGERY Left 11/04/1997    trabulect     LASER SELECTIVE TRABECULOPLASTY  2008     SURGICAL HISTORY OF -   1964    THYROID NODULE      SURGICAL HISTORY OF -   2008    LUMPECTOMY       Social History     Tobacco Use     Smoking status: Never Smoker     Smokeless tobacco: Never Used   Substance Use Topics     Alcohol use: No     Family History   Problem Relation Age of Onset     Diabetes Mother      Glaucoma Mother      Cancer - colorectal Father      Cancer Sister         OVARIAN     Cancer Sister         pancreatic cancer     Diabetes Sister      Glaucoma Sister      Diabetes Sister      Glaucoma Sister      Diabetes Sister      Macular Degeneration No family hx of          Current Outpatient Medications   Medication Sig Dispense Refill     amLODIPine (NORVASC) 5 MG tablet Take 1  "tablet (5 mg) by mouth daily 30 tablet 1     B COMPLEX OR 1 TABLET DAILY        FISH OIL 1000 MG OR CAPS 2 TABLET DAILY        latanoprost (XALATAN) 0.005 % ophthalmic solution Place 1 drop into both eyes At Bedtime RE 7.5 mL 3     losartan (COZAAR) 100 MG tablet Take 1 tablet (100 mg) by mouth daily 30 tablet 3     MULTI-VITAMIN OR 1 TABLET DAILY        timolol (TIMOPTIC) 0.5 % ophthalmic solution Place 1 drop into both eyes 2 times daily 30 mL 3     VITAMIN C OR 1 TABLET DAILY        VITAMIN D 1000 UNIT OR CAPS 1 CAPSULE DAILY       VITAMIN E OR 1 TABLET DAILY        Allergies   Allergen Reactions     Brimonidine Other (See Comments)     Other reaction(s): Erythema       Reviewed and updated as needed this visit by Provider         Review of Systems   ROS COMP: Constitutional, HEENT, cardiovascular, pulmonary, GI, , musculoskeletal, neuro, skin, endocrine and psych systems are negative, except as otherwise noted.    This document serves as a record of the services and decisions personally performed and made by Karla Hurt MD. It was created on her behalf by Lucius Richmond, a trained medical scribe. The creation of this document is based on the provider's statements to the medical scribe.  Lucius Richmond 2:11 PM August 12, 2019      Objective    BP (!) 170/82 (BP Location: Right arm, Patient Position: Sitting, Cuff Size: Adult Regular)   Pulse 80   Temp 98.1  F (36.7  C) (Oral)   Resp 13   Ht 1.632 m (5' 4.25\")   Wt 61.5 kg (135 lb 9.6 oz)   SpO2 99%   BMI 23.09 kg/m    Body mass index is 23.09 kg/m .  Physical Exam   GENERAL: healthy, alert and no distress  MS: no gross musculoskeletal defects noted, no edema, discoloration on the 2nd and 4th toe on the left foot.  NEURO: Normal strength and tone, mentation intact and speech normal  PSYCH: mentation appears normal, affect normal/bright    Diagnostic Test Results:  Labs reviewed in Epic  No results found for this or any previous visit (from the past 24 hour(s)).   "     Assessment & Plan     1. Hypertension goal BP (blood pressure) < 140/90  Stable. Patient notes that her systolic ranges around 178-180 and diastolic ranges around 67-78.  Basic metabolic panel checked today  Prescription ordered today- plan discussed with patient per patient instructions.  Next step would be beta blocker  - Basic metabolic panel  - amLODIPine (NORVASC) 5 MG tablet; Take 1 tablet (5 mg) by mouth daily  Dispense: 30 tablet; Refill: 1    2. Pseudogout involving multiple joints  Exam shows discoloration on the 2nd and 4th toe on the left foot.  Basic metabolic panel checked today.  Parathyroid hormone tests checked today.  Will continue to monitor.  Next step would be for her to use her prednisone for a flare and to start colchicine daily for prevention  - Basic metabolic panel  - Parathyroid Hormone Intact       Patient Instructions   Start Amlodipine 5 mg daily for blood pressure.  Call or MyChart in 2-3 weeks with your average blood pressure and pulse.      The information in this document, created by the medical scribe for me, accurately reflects the services I personally performed and the decisions made by me. I have reviewed and approved this document for accuracy prior to leaving the patient care area.  August 12, 2019 2:16 PM    I spent 10 minutes of time with the patient and >50% of it was in education and counseling regarding health maintenance and medication recheck.    Karla Hurt MD  [facility]     No

## 2019-09-30 ENCOUNTER — TELEPHONE (OUTPATIENT)
Dept: FAMILY MEDICINE | Facility: CLINIC | Age: 84
End: 2019-09-30

## 2019-09-30 DIAGNOSIS — I10 HYPERTENSION GOAL BP (BLOOD PRESSURE) < 140/90: ICD-10-CM

## 2019-09-30 DIAGNOSIS — M11.89 PSEUDOGOUT INVOLVING MULTIPLE JOINTS: ICD-10-CM

## 2019-09-30 RX ORDER — LOSARTAN POTASSIUM 100 MG/1
100 TABLET ORAL DAILY
Qty: 90 TABLET | Refills: 3 | Status: SHIPPED | OUTPATIENT
Start: 2019-09-30

## 2019-09-30 RX ORDER — AMLODIPINE BESYLATE 5 MG/1
5 TABLET ORAL DAILY
Qty: 90 TABLET | Refills: 3 | Status: SHIPPED | OUTPATIENT
Start: 2019-09-30 | End: 2019-10-02

## 2019-09-30 NOTE — TELEPHONE ENCOUNTER
"Per 8/12/19 OV notes:    \"Start Amlodipine 5 mg daily for blood pressure.  Call or MyChart in 2-3 weeks with your average blood pressure and pulse\"    Jono Neves RN    "

## 2019-09-30 NOTE — TELEPHONE ENCOUNTER
Voice message left for patient regarding below message. Patient can speak with anyone on the pink team. Call back number given. ALCON Roque

## 2019-09-30 NOTE — TELEPHONE ENCOUNTER
MA- Please notify patient    Per Dr. Hurt: BP is good. Continue current plan. Amlodipine and losartan refilled for 1 year supply    Jono Neves RN

## 2019-09-30 NOTE — TELEPHONE ENCOUNTER
Reason for Call:  Other FYI    Detailed comments:    /72 for 28 days    Phone Number Patient can be reached at: Home number on file [phone] (home)    Best Time: any    Can we leave a detailed message on this number? YES    Call taken on 9/30/2019 at 1:19 PM by Darci Richmond

## 2019-09-30 NOTE — TELEPHONE ENCOUNTER
Saba message received from patient rep, Sasha Ramesh, stating that patient is returning call  Asked Sasha to let patient know that we have refilled her BP meds and that we thank her for updating Dr. Hurt on her BP   Sasha has relayed the message to patient  Patient did not have any further questions at this time    Jono Neves RN

## 2019-10-01 DIAGNOSIS — I10 HYPERTENSION GOAL BP (BLOOD PRESSURE) < 140/90: ICD-10-CM

## 2019-10-01 NOTE — TELEPHONE ENCOUNTER
Reason for Call:  Other prescription    Detailed comments: Patient calling stating that the pharmacy received the wrong blood pressure medication, states that it was suppose to be Norvasc 5mg and not Losartan 100mg. Please call to advise.     Phone Number Patient can be reached at: Home number on file [phone] (home)    Best Time: Any     Can we leave a detailed message on this number? YES    Call taken on 10/1/2019 at 3:50 PM by Rosmery Mariano

## 2019-10-02 RX ORDER — AMLODIPINE BESYLATE 5 MG/1
5 TABLET ORAL DAILY
Qty: 90 TABLET | Refills: 3 | Status: SHIPPED | OUTPATIENT
Start: 2019-10-02 | End: 2019-12-12

## 2019-10-23 ENCOUNTER — TRANSFERRED RECORDS (OUTPATIENT)
Dept: HEALTH INFORMATION MANAGEMENT | Facility: CLINIC | Age: 84
End: 2019-10-23

## 2019-10-30 ENCOUNTER — NURSE TRIAGE (OUTPATIENT)
Dept: FAMILY MEDICINE | Facility: CLINIC | Age: 84
End: 2019-10-30

## 2019-10-30 NOTE — TELEPHONE ENCOUNTER
Per Dr. Hurt: patient should be on both amlodipine and losartan. Advised her that she should have refills at the pharmacy    Patient verbalized understanding.    Jono Neves RN

## 2019-10-30 NOTE — TELEPHONE ENCOUNTER
Reason for call:  Other   Patient called regarding (reason for call): prescription  Additional comments: Her blood pressure was high today and she was not able to have a procedure done. Her 179/80. She is taking medication. Please call her to discuss.     Phone number to reach patient:  Home number on file [phone] (home)    Best Time:  Any     Can we leave a detailed message on this number?  YES

## 2019-10-30 NOTE — TELEPHONE ENCOUNTER
Called and spoke with patient. States she was in yesterday at her Dermatologist to have some lesions removed but was unable due to elevated BP, 179/80.  Patient states she has only been taking amlodipine 5 mg daily.   She has been checking her BP at home and has been elevated like this.   Patient states she has not been taking the losartan 100 mg because she thought it was discontinued. Advised patient that 9/30/19 encounter states she should be on both losartan 100 mg and amlodipine 5 mg daily.   Patient wanted this confirmed again with Dr. Hurt that she is supposed to be on both.    Nadine Smith RN

## 2019-11-20 ENCOUNTER — TELEPHONE (OUTPATIENT)
Dept: INTERNAL MEDICINE | Facility: CLINIC | Age: 84
End: 2019-11-20

## 2019-11-20 NOTE — TELEPHONE ENCOUNTER
Panel Management Review      Patient has the following on her problem list:     Hypertension   Last three blood pressure readings:  BP Readings from Last 3 Encounters:   08/12/19 (!) 170/82   07/10/19 138/78   07/01/19 144/78     Blood pressure: MONITOR    HTN Guidelines:  Less than 140/90      Composite cancer screening  Chart review shows that this patient is due/due soon for the following None  Summary:    Patient is due/failing the following:   BP CHECK and PHYSICAL    Action needed:   Patient needs office visit for Physical.    Type of outreach:    Sent Migoa message.    Questions for provider review:    Patient last seen on 8/12/2019 with PCP and was told to follow up in 2 months for a physical which patient failed to do so. BP was 170/82 at this office visit                                                                                                                                    LS     Chart routed to none .

## 2019-12-12 ENCOUNTER — OFFICE VISIT (OUTPATIENT)
Dept: FAMILY MEDICINE | Facility: CLINIC | Age: 84
End: 2019-12-12
Payer: MEDICARE

## 2019-12-12 VITALS
DIASTOLIC BLOOD PRESSURE: 82 MMHG | HEIGHT: 64 IN | RESPIRATION RATE: 20 BRPM | OXYGEN SATURATION: 100 % | SYSTOLIC BLOOD PRESSURE: 172 MMHG | TEMPERATURE: 97.1 F | WEIGHT: 133.6 LBS | HEART RATE: 98 BPM | BODY MASS INDEX: 22.81 KG/M2

## 2019-12-12 DIAGNOSIS — Z00.00 ROUTINE GENERAL MEDICAL EXAMINATION AT A HEALTH CARE FACILITY: Primary | ICD-10-CM

## 2019-12-12 DIAGNOSIS — I10 HYPERTENSION GOAL BP (BLOOD PRESSURE) < 140/90: ICD-10-CM

## 2019-12-12 LAB
ANION GAP SERPL CALCULATED.3IONS-SCNC: 8 MMOL/L (ref 3–14)
BUN SERPL-MCNC: 13 MG/DL (ref 7–30)
CALCIUM SERPL-MCNC: 9.9 MG/DL (ref 8.5–10.1)
CHLORIDE SERPL-SCNC: 107 MMOL/L (ref 94–109)
CO2 SERPL-SCNC: 27 MMOL/L (ref 20–32)
CREAT SERPL-MCNC: 0.64 MG/DL (ref 0.52–1.04)
GFR SERPL CREATININE-BSD FRML MDRD: 82 ML/MIN/{1.73_M2}
GLUCOSE SERPL-MCNC: 106 MG/DL (ref 70–99)
POTASSIUM SERPL-SCNC: 3.5 MMOL/L (ref 3.4–5.3)
SODIUM SERPL-SCNC: 142 MMOL/L (ref 133–144)

## 2019-12-12 PROCEDURE — 36415 COLL VENOUS BLD VENIPUNCTURE: CPT | Performed by: PHYSICIAN ASSISTANT

## 2019-12-12 PROCEDURE — 80048 BASIC METABOLIC PNL TOTAL CA: CPT | Performed by: PHYSICIAN ASSISTANT

## 2019-12-12 PROCEDURE — G0439 PPPS, SUBSEQ VISIT: HCPCS | Performed by: PHYSICIAN ASSISTANT

## 2019-12-12 RX ORDER — AMLODIPINE BESYLATE 10 MG/1
10 TABLET ORAL DAILY
Qty: 30 TABLET | Refills: 1 | Status: SHIPPED | OUTPATIENT
Start: 2019-12-12 | End: 2020-01-29

## 2019-12-12 ASSESSMENT — ACTIVITIES OF DAILY LIVING (ADL): CURRENT_FUNCTION: NO ASSISTANCE NEEDED

## 2019-12-12 ASSESSMENT — MIFFLIN-ST. JEOR: SCORE: 1040.63

## 2019-12-12 NOTE — PROGRESS NOTES
"SUBJECTIVE:   Lacey Bah is a 84 year old female who presents for Preventive Visit.    Are you in the first 12 months of your Medicare coverage?  No    Healthy Habits:     In general, how would you rate your overall health?  Good    Frequency of exercise:  2-3 days/week    Duration of exercise:  45-60 minutes    Do you usually eat at least 4 servings of fruit and vegetables a day, include whole grains    & fiber and avoid regularly eating high fat or \"junk\" foods?  Yes    Taking medications regularly:  Yes    Barriers to taking medications:  None    Medication side effects:  Other (Losartan-feels tired)    Ability to successfully perform activities of daily living:  No assistance needed    Home Safety:  No safety concerns identified    Hearing Impairment:  No hearing concerns    In the past 6 months, have you been bothered by leaking of urine? Yes    In general, how would you rate your overall mental or emotional health?  Good      PHQ-2 Total Score: 0    Additional concerns today:  Yes    Do you feel safe in your environment? Yes    Have you ever done Advance Care Planning? (For example, a Health Directive, POLST, or a discussion with a medical provider or your loved ones about your wishes): No, advance care planning information given to patient to review.  Patient declined advance care planning discussion at this time.      Fall risk  Fallen 2 or more times in the past year?: No  Any fall with injury in the past year?: No    Cognitive Screening : Patient refused to do cognitive screening today.     Do you have sleep apnea, excessive snoring or daytime drowsiness?: no    Reviewed and updated as needed this visit by clinical staff  Tobacco  Allergies  Meds  Med Hx  Surg Hx  Fam Hx  Soc Hx        Patient has been taking BP readings at home.  Hasn't seen SBP values in the 170s lately since the change to Losartan and Amlodipine.    Social History     Tobacco Use     Smoking status: Never Smoker     Smokeless " "tobacco: Never Used   Substance Use Topics     Alcohol use: No     If you drink alcohol do you typically have >3 drinks per day or >7 drinks per week? No    Alcohol Use 10/2/2018   Prescreen: >3 drinks/day or >7 drinks/week? Not Applicable   Prescreen: >3 drinks/day or >7 drinks/week? -           -------------------------------------    Current providers sharing in care for this patient include:   Patient Care Team:  Karla Hurt MD as PCP - General  Karla Hurt MD as Assigned PCP    The following health maintenance items are reviewed in Epic and correct as of today:  Health Maintenance   Topic Date Due     PNEUMOCOCCAL IMMUNIZATION 65+ LOW/MEDIUM RISK (2 of 2 - PPSV23) 02/02/2019     INFLUENZA VACCINE (1) 09/01/2019     MEDICARE ANNUAL WELLNESS VISIT  10/03/2019     FALL RISK ASSESSMENT  04/08/2020     ADVANCE CARE PLANNING  10/03/2023     DTAP/TDAP/TD IMMUNIZATION (4 - Td) 07/12/2025     PHQ-2  Completed     DEXA  Addressed     IPV IMMUNIZATION  Aged Out     MENINGITIS IMMUNIZATION  Aged Out       Review of Systems  Constitutional, HEENT, cardiovascular, pulmonary, gi and gu systems are negative, except as otherwise noted.    OBJECTIVE:   BP (!) 172/82   Pulse 98   Temp 97.1  F (36.2  C) (Oral)   Resp 20   Ht 1.625 m (5' 3.98\")   Wt 60.6 kg (133 lb 9.6 oz)   SpO2 100%   BMI 22.95 kg/m   Estimated body mass index is 22.95 kg/m  as calculated from the following:    Height as of this encounter: 1.625 m (5' 3.98\").    Weight as of this encounter: 60.6 kg (133 lb 9.6 oz).  Physical Exam  GENERAL APPEARANCE: healthy, alert and no distress  EYES: Eyes grossly normal to inspection, PERRL and conjunctivae and sclerae normal  HENT: ear canals and TM's normal, nose and mouth without ulcers or lesions, oropharynx clear and oral mucous membranes moist  NECK: no adenopathy, no asymmetry, masses, or scars and thyroid normal to palpation  RESP: lungs clear to auscultation - no rales, rhonchi or wheezes  CV: " "regular rate and rhythm, normal S1 S2, no S3 or S4, no murmur, click or rub, no peripheral edema and peripheral pulses strong  ABDOMEN: soft, nontender, no hepatosplenomegaly, no masses and bowel sounds normal  MS: no musculoskeletal defects are noted and gait is age appropriate without ataxia  SKIN: no suspicious lesions or rashes and multiple seborrheic keratosis  NEURO: Normal strength and tone, sensory exam grossly normal, mentation intact and speech normal  PSYCH: mentation appears normal and affect normal/bright    Diagnostic Test Results:  Labs reviewed in Epic    ASSESSMENT / PLAN:   1. Routine general medical examination at a health care facility    2. Hypertension goal BP (blood pressure) < 140/90  - amLODIPine (NORVASC) 10 MG tablet; Take 1 tablet (10 mg) by mouth daily  Dispense: 30 tablet; Refill: 1    BP checks 4 x per week  Follow up in 1 month for recheck  Follow up on labs.  Patient amenable to this follow up plan.       COUNSELING:  Reviewed preventive health counseling, as reflected in patient instructions    Estimated body mass index is 22.95 kg/m  as calculated from the following:    Height as of this encounter: 1.625 m (5' 3.98\").    Weight as of this encounter: 60.6 kg (133 lb 9.6 oz).    Weight management plan Patient was referred to their PCP to discuss a diet and exercise plan.     reports that she has never smoked. She has never used smokeless tobacco.      Appropriate preventive services were discussed with this patient, including applicable screening as appropriate for cardiovascular disease, diabetes, osteopenia/osteoporosis, and glaucoma.  As appropriate for age/gender, discussed screening for colorectal cancer, prostate cancer, breast cancer, and cervical cancer. Checklist reviewing preventive services available has been given to the patient.    Reviewed patients plan of care and provided an AVS. The Basic Care Plan (routine screening as documented in Health Maintenance) for Lacey " meets the Care Plan requirement. This Care Plan has been established and reviewed with the Patient.    Counseling Resources:  ATP IV Guidelines  Pooled Cohorts Equation Calculator  Breast Cancer Risk Calculator  FRAX Risk Assessment  ICSI Preventive Guidelines  Dietary Guidelines for Americans, 2010  USDA's MyPlate  ASA Prophylaxis  Lung CA Screening    Latosha Mccarthy PA-C  [facility] [place]    Identified Health Risks:

## 2020-01-24 DIAGNOSIS — I10 HYPERTENSION GOAL BP (BLOOD PRESSURE) < 140/90: ICD-10-CM

## 2020-01-28 NOTE — TELEPHONE ENCOUNTER
Patient is schedule for march 6th and only wanted to see Dr. Hurt and that was her next availability. patient is wondering if she can get refills to last her until her appointment in march  Dipak Richmond CMA on 1/28/2020 at 12:01 PM

## 2020-01-28 NOTE — TELEPHONE ENCOUNTER
Please call to schedule-BP med recheck  Instructions   Return in about 4 weeks (around 1/9/2020).

## 2020-01-29 RX ORDER — AMLODIPINE BESYLATE 10 MG/1
TABLET ORAL
Qty: 90 TABLET | Refills: 0 | Status: SHIPPED | OUTPATIENT
Start: 2020-01-29 | End: 2020-02-28

## 2020-01-29 NOTE — TELEPHONE ENCOUNTER
Routing refill request to provider for review/approval because:  BP Readings from Last 3 Encounters:   12/12/19 (!) 172/82   08/12/19 (!) 170/82   07/10/19 138/78

## 2020-02-23 ENCOUNTER — HEALTH MAINTENANCE LETTER (OUTPATIENT)
Age: 85
End: 2020-02-23

## 2020-03-03 NOTE — PROGRESS NOTES
Subjective     Lacey Bah is a 84 year old female who presents to clinic today for the following health issues:      Patient Instructions on Last Visit 8/12/2019:  Patient Instructions   Start Amlodipine 5 mg daily for blood pressure.  Call or MyChart in 2-3 weeks with your average blood pressure and pulse.      HPI   BP Readings from Last 3 Encounters:   03/06/20 (!) 184/84   12/12/19 (!) 172/82   08/12/19 (!) 170/82     Hypertension   She has been on hydrochlorothiazide in past but it gave her gout. Her amlodipine was recently increased to 10 mg daily. She was doing well with the 10 mg but is thinking about going back to 5 mg. She notes that her pharm advised her risks of dental/gum issues-she would like a second option about continuing the 10 mg. Right now she is back down on the 5 mg- only took one 10 this morning because she knew her blood pressure would be high coming to the clinic. She is at 143/71-76 when she is on 5 mg but her blood pressures varies depending when she takes her medications. Her blood pressure on 10 mg has been 120s/80.    Other concerns:  - She went to derm for skin biopsy and had high blood pressure- relates it to white-coat syndrome.  - She's had cataract years ago and notes a complication with scratched her cornea. She had another cataract done a few months ago and her follow-up was just recently. She notes that her eyes are stable.  - Declines flu vaccine today.    Patient Active Problem List   Diagnosis     Hypertension goal BP (blood pressure) < 140/90     DCIS (ductal carcinoma in situ) of breast     HTN (hypertension)     Uterine disorder     Impaired fasting glucose     Hyperlipidemia LDL goal <130     Family history of colon cancer     Advanced directives, counseling/discussion     Tubular adenoma of colon     Skin exam, screening for cancer     Nontoxic multinodular goiter     Primary open angle glaucoma     Senile nuclear sclerosis     Reactive arthritis (H)     Anemia,  unspecified type     Pseudogout involving multiple joints     Past Surgical History:   Procedure Laterality Date     CATARACT IOL, RT/LT Left 04/15/2014     GLAUCOMA SURGERY Left 11/04/1997    trabulect     LASER SELECTIVE TRABECULOPLASTY  2008     SURGICAL HISTORY OF -   1964    THYROID NODULE      SURGICAL HISTORY OF -   2008    LUMPECTOMY       Social History     Tobacco Use     Smoking status: Never Smoker     Smokeless tobacco: Never Used   Substance Use Topics     Alcohol use: No     Family History   Problem Relation Age of Onset     Diabetes Mother      Glaucoma Mother      Cancer - colorectal Father      Cancer Sister         OVARIAN     Cancer Sister         pancreatic cancer     Diabetes Sister      Glaucoma Sister      Diabetes Sister      Glaucoma Sister      Diabetes Sister      Macular Degeneration No family hx of          Current Outpatient Medications   Medication Sig Dispense Refill     amLODIPine (NORVASC) 10 MG tablet TAKE 1 TABLET BY MOUTH ONCE DAILY 90 tablet 0     B COMPLEX OR 1 TABLET DAILY        FISH OIL 1000 MG OR CAPS 2 TABLET DAILY        latanoprost (XALATAN) 0.005 % ophthalmic solution Place 1 drop into both eyes At Bedtime RE 7.5 mL 3     losartan (COZAAR) 100 MG tablet Take 1 tablet (100 mg) by mouth daily 90 tablet 3     MULTI-VITAMIN OR 1 TABLET DAILY        timolol (TIMOPTIC) 0.5 % ophthalmic solution Place 1 drop into both eyes 2 times daily 30 mL 3     VITAMIN C OR 1 TABLET DAILY        VITAMIN D 1000 UNIT OR CAPS 1 CAPSULE DAILY       VITAMIN E OR 1 TABLET DAILY        Allergies   Allergen Reactions     Brimonidine Other (See Comments)     Other reaction(s): Erythema     BP Readings from Last 3 Encounters:   03/06/20 (!) 184/84   12/12/19 (!) 172/82   08/12/19 (!) 170/82    Wt Readings from Last 3 Encounters:   03/06/20 60.8 kg (134 lb)   12/12/19 60.6 kg (133 lb 9.6 oz)   08/12/19 61.5 kg (135 lb 9.6 oz)           Reviewed and updated as needed this visit by Provider  Mulu   "Allergies  Meds  Problems  Med Hx  Surg Hx  Fam Hx         Review of Systems   ROS COMP: Constitutional, HEENT, cardiovascular, pulmonary, GI, , musculoskeletal, neuro, skin, endocrine and psych systems are negative, except as otherwise noted.    This document serves as a record of the services and decisions personally performed and made by Karla Hurt MD. It was created on her behalf by Lucius Richmond, a trained medical scribe. The creation of this document is based on the provider's statements to the medical scribe.  Lucius Richmond 11:23 AM March 6, 2020      Objective    BP (!) 184/84   Pulse 84   Temp 98.1  F (36.7  C) (Oral)   Resp 16   Ht 1.626 m (5' 4\")   Wt 60.8 kg (134 lb)   SpO2 99%   BMI 23.00 kg/m    Body mass index is 23 kg/m .  Physical Exam   GENERAL: healthy, alert and no distress  RESP: lungs clear to auscultation - no rales, rhonchi or wheezes  CV: regular rate and rhythm, normal S1 S2, no S3 or S4, no murmur, click or rub, no peripheral edema and peripheral pulses strong  MS: no gross musculoskeletal defects noted, no edema  PSYCH: mentation appears normal, affect normal/bright      Diagnostic Test Results:  Labs reviewed in Epic  No results found for this or any previous visit (from the past 24 hour(s)).        Assessment & Plan     1. White coat syndrome with diagnosis of hypertension  Patient has elevated blood pressure today- 184/84 has white coat hypertension.    2. Hypertension goal BP (blood pressure) < 140/90  Advised patient to continue with 10 mg amlodipine. She will let me know if she wants to change her dose to 7.5 mg.  Next medication to consider will be a beta-blocker. Will continue to monitor.  - amLODIPine (NORVASC) 10 MG tablet; Take 1 tablet (10 mg) by mouth daily  Dispense: 90 tablet; Refill: 4      There are no Patient Instructions on file for this visit.      The information in this document, created by the medical scribe for me, accurately reflects the services I " personally performed and the decisions made by me. I have reviewed and approved this document for accuracy prior to leaving the patient care area.  March 6, 2020 11:23 AM    Karla Hurt MD  -[facility]

## 2020-03-06 ENCOUNTER — OFFICE VISIT (OUTPATIENT)
Dept: INTERNAL MEDICINE | Facility: CLINIC | Age: 85
End: 2020-03-06
Payer: MEDICARE

## 2020-03-06 VITALS
SYSTOLIC BLOOD PRESSURE: 184 MMHG | WEIGHT: 134 LBS | HEART RATE: 84 BPM | RESPIRATION RATE: 16 BRPM | BODY MASS INDEX: 22.88 KG/M2 | DIASTOLIC BLOOD PRESSURE: 84 MMHG | HEIGHT: 64 IN | OXYGEN SATURATION: 99 % | TEMPERATURE: 98.1 F

## 2020-03-06 DIAGNOSIS — I10 HYPERTENSION GOAL BP (BLOOD PRESSURE) < 140/90: ICD-10-CM

## 2020-03-06 DIAGNOSIS — I10 WHITE COAT SYNDROME WITH DIAGNOSIS OF HYPERTENSION: Primary | ICD-10-CM

## 2020-03-06 PROBLEM — M02.30 REACTIVE ARTHRITIS (H): Status: RESOLVED | Noted: 2019-07-03 | Resolved: 2020-03-06

## 2020-03-06 PROCEDURE — 99213 OFFICE O/P EST LOW 20 MIN: CPT | Performed by: INTERNAL MEDICINE

## 2020-03-06 RX ORDER — AMLODIPINE BESYLATE 10 MG/1
10 TABLET ORAL DAILY
Qty: 90 TABLET | Refills: 4 | Status: SHIPPED | OUTPATIENT
Start: 2020-03-06 | End: 2020-05-15

## 2020-03-06 ASSESSMENT — MIFFLIN-ST. JEOR: SCORE: 1042.82

## 2020-05-14 ENCOUNTER — NURSE TRIAGE (OUTPATIENT)
Dept: FAMILY MEDICINE | Facility: CLINIC | Age: 85
End: 2020-05-14

## 2020-05-14 NOTE — TELEPHONE ENCOUNTER
Spoke with pt. States she had a visit with Dr. Hurt in March. They discussed switching her BP medication. She is currently taking Losartan and Norvasc. Is having trouble with gums and canker sores. She thought a pharmacist said that amlodipine can cause gum issues. Doesn't want to take amlodipine. Wants to change to something different for both of her medications.  She asked what can she take for canker sores. Has about 9-10 of them. Has tried anbusol with no relief. Is hard to talk or eat. Has had them for 1-2 weeks. Advised to try LIQUID ANTACID FOR MOUTH PAIN  For mouth pain, use a liquid antacid (such as Mylanta or the store brand). Give 4 times per day as needed. After meals often is a good time.  Put a few drops in the mouth. Can also put it on with a cotton swab.  Avoid salty, spicy, citrusy or foods that are hard to chew.     Please advise on alternate BP medication.    Lavern Garvey RN  [facility]

## 2020-05-14 NOTE — TELEPHONE ENCOUNTER
Reason for call:  Symptom   Symptom or request: High blood pressure    Duration (how long have symptoms been present): long time   Have you been treated for this before? Yes    Additional comments: Please call to discuss medication    Phone number to reach patient:  Home number on file [phone] (home)    Best Time:  any    Can we leave a detailed message on this number?  YES    Travel screening: Not Applicable

## 2020-05-15 ENCOUNTER — VIRTUAL VISIT (OUTPATIENT)
Dept: INTERNAL MEDICINE | Facility: CLINIC | Age: 85
End: 2020-05-15
Payer: MEDICARE

## 2020-05-15 DIAGNOSIS — I10 HYPERTENSION GOAL BP (BLOOD PRESSURE) < 140/90: Primary | ICD-10-CM

## 2020-05-15 PROCEDURE — 99442: CPT | Performed by: INTERNAL MEDICINE

## 2020-05-15 RX ORDER — METOPROLOL SUCCINATE 25 MG/1
25 TABLET, EXTENDED RELEASE ORAL DAILY
Qty: 30 TABLET | Refills: 1 | Status: SHIPPED | OUTPATIENT
Start: 2020-05-15 | End: 2020-07-10

## 2020-05-15 NOTE — TELEPHONE ENCOUNTER
Called and spoke with patient and gave information. Verbalized understanding. Scheduled patient for telephone visit today.     Nadine Smith RN

## 2020-05-15 NOTE — PROGRESS NOTES
"Lacey Bah is a 84 year old female who is being evaluated via a billable telephone visit.      The patient has been notified of following:     \"This telephone visit will be conducted via a call between you and your physician/provider. We have found that certain health care needs can be provided without the need for a physical exam.  This service lets us provide the care you need with a short phone conversation.  If a prescription is necessary we can send it directly to your pharmacy.  If lab work is needed we can place an order for that and you can then stop by our lab to have the test done at a later time.    Telephone visits are billed at different rates depending on your insurance coverage. During this emergency period, for some insurers they may be billed the same as an in-person visit.  Please reach out to your insurance provider with any questions.    If during the course of the call the physician/provider feels a telephone visit is not appropriate, you will not be charged for this service.\"    Patient has given verbal consent for Telephone visit?  Yes    What phone number would you like to be contacted at? [phone]    How would you like to obtain your AVS? Isidro Ag     Lacey Bah is a 84 year old female who presents to clinic today for the following health issues:    HPI  Hypertension Follow-up      Do you check your blood pressure regularly outside of the clinic? Yes     Are you following a low salt diet? Yes    Are your blood pressures ever more than 140 on the top number (systolic) OR more   than 90 on the bottom number (diastolic), for example 140/90? Yes - sometimes       How many servings of fruits and vegetables do you eat daily?  2-3    On average, how many sweetened beverages do you drink each day (Examples: soda, juice, sweet tea, etc.  Do NOT count diet or artificially sweetened beverages)?   1    How many days per week do you exercise enough to make your heart beat " faster? 3 or less    How many minutes a day do you exercise enough to make your heart beat faster? 9 or less    How many days per week do you miss taking your medication? 0         She is on amlodipine and Losartan.  She was on hydrochlorothiazide but had to be stopped due to gout.  She was on an ACE but was changed to losartan to help with gout.      Her blood pressure is 150 systolic and then will drop down to 140. Pulse 70's      Reviewed and updated as needed this visit by Provider         Review of Systems    ROS: 4 point ROS neg other than the symptoms noted above in the HPI.         Objective   Reported vitals:  There were no vitals taken for this visit.   healthy, alert and no distress  PSYCH: Alert and oriented times 3; coherent speech, normal   rate and volume, able to articulate logical thoughts, able   to abstract reason, no tangential thoughts, no hallucinations   or delusions  Her affect is normal  RESP: No cough, no audible wheezing, able to talk in full sentences  Remainder of exam unable to be completed due to telephone visits    Diagnostic Test Results:  Labs reviewed in Epic        Assessment/Plan:  1. Hypertension goal BP (blood pressure) < 140/90  Per patient instructions.   - metoprolol succinate ER (TOPROL-XL) 25 MG 24 hr tablet; Take 1 tablet (25 mg) by mouth daily To replace amlodipine  Dispense: 30 tablet; Refill: 1    No follow-ups on file.  Patient Instructions   Stop Amlodipine.  Start Toprol XL 1 tab daily.  Keep Losartan unchanged.         Phone call duration:  11 minutes    Karla Hurt MD     Start 2:22 pm  Stop 2:33 PM

## 2020-05-20 ENCOUNTER — VIRTUAL VISIT (OUTPATIENT)
Dept: INTERNAL MEDICINE | Facility: CLINIC | Age: 85
End: 2020-05-20
Payer: MEDICARE

## 2020-05-20 DIAGNOSIS — I10 HYPERTENSION GOAL BP (BLOOD PRESSURE) < 140/90: Primary | ICD-10-CM

## 2020-05-20 DIAGNOSIS — T88.7XXA MEDICATION SIDE EFFECTS: ICD-10-CM

## 2020-05-20 PROCEDURE — 99207 ZZC NON-BILLABLE SERV PER CHARTING: CPT | Performed by: INTERNAL MEDICINE

## 2020-06-23 ENCOUNTER — TRANSFERRED RECORDS (OUTPATIENT)
Dept: HEALTH INFORMATION MANAGEMENT | Facility: CLINIC | Age: 85
End: 2020-06-23

## 2020-07-01 ENCOUNTER — TELEPHONE (OUTPATIENT)
Dept: FAMILY MEDICINE | Facility: CLINIC | Age: 85
End: 2020-07-01

## 2020-07-01 NOTE — TELEPHONE ENCOUNTER
Reason for Call:  Other Pre-Op results    Detailed comments: Patient had pre-op with Dr. Jen Khanna at [facility] and wants to speak/update Hurt on results.    Phone Number Patient can be reached at: Other phone number:  [phone]*    Best Time: Anytime    Can we leave a detailed message on this number? Not Applicable    Call taken on 7/1/2020 at 11:54 AM by Johnathon Peralta

## 2020-07-01 NOTE — TELEPHONE ENCOUNTER
Please call and schedule face-to-face visit for next week unless she would rather schedule with Dr. Khanna at [facility].     Nadine Smith RN

## 2020-07-01 NOTE — TELEPHONE ENCOUNTER
Spoke with Dr. Khanna, family medicine provider at [facility]. She wanted to inform Dr. Hurt of updates on patient.  She states she saw patient for a pre-op for rectal bleeding and completed blood work and was found to have elevated WBC, hypokalemia, and anemia. Patient also completed CT scan which shows a new lung mass concerning for malignancy.  Dr. Khanna spoke with surgeon Dr. Paniagua regarding results and CT scan and is proceeding with biopsy today.  She has been referred to [facility] oncology for lung mass.   Dr. Khanna would like patient to follow-up in 1 week with either one of Dr. Hurt's colleagues or herself.   Best # to reach is [facility] [phone].    Notes are available in Epic.     Nadine Smith RN

## 2020-07-02 NOTE — TELEPHONE ENCOUNTER
Spoke to patient and she will finish her visit with Dr. Khanna since she started with them already  Dipak Richmond CMA on 7/2/2020 at 8:07 AM

## 2020-07-10 DIAGNOSIS — I10 HYPERTENSION GOAL BP (BLOOD PRESSURE) < 140/90: ICD-10-CM

## 2020-07-10 RX ORDER — METOPROLOL SUCCINATE 25 MG/1
TABLET, EXTENDED RELEASE ORAL
Qty: 30 TABLET | Refills: 0 | Status: SHIPPED | OUTPATIENT
Start: 2020-07-10 | End: 2020-08-03

## 2020-07-10 NOTE — TELEPHONE ENCOUNTER
"Routing refill request to provider for review/approval because:  Labs out of range:  BP    Requested Prescriptions   Pending Prescriptions Disp Refills     metoprolol succinate ER (TOPROL-XL) 25 MG 24 hr tablet [Pharmacy Med Name: Metoprolol Succinate ER 25 MG Oral Tablet Extended Release 24 Hour] 30 tablet 0     Sig: TAKE 1 TABLET BY MOUTH ONCE DAILY TO REPLACE AMLODIPINE.       Beta-Blockers Protocol Failed - 7/10/2020  9:41 AM        Failed - Blood pressure under 140/90 in past 12 months     BP Readings from Last 3 Encounters:   03/06/20 (!) 184/84   12/12/19 (!) 172/82   08/12/19 (!) 170/82                 Passed - Patient is age 6 or older        Passed - Recent (12 mo) or future (30 days) visit within the authorizing provider's specialty     Patient has had an office visit with the authorizing provider or a provider within the authorizing providers department within the previous 12 mos or has a future within next 30 days. See \"Patient Info\" tab in inbasket, or \"Choose Columns\" in Meds & Orders section of the refill encounter.              Passed - Medication is active on med list           Nadine Smith RN  "

## 2020-07-30 DIAGNOSIS — I10 HYPERTENSION GOAL BP (BLOOD PRESSURE) < 140/90: ICD-10-CM

## 2020-08-02 NOTE — TELEPHONE ENCOUNTER
Routing refill request to provider for review/approval because:  BP Readings from Last 3 Encounters:   03/06/20 (!) 184/84   12/12/19 (!) 172/82   08/12/19 (!) 170/82

## 2020-08-03 RX ORDER — METOPROLOL SUCCINATE 25 MG/1
TABLET, EXTENDED RELEASE ORAL
Qty: 30 TABLET | Refills: 0 | Status: SHIPPED | OUTPATIENT
Start: 2020-08-03

## 2020-10-27 ENCOUNTER — TRANSFERRED RECORDS (OUTPATIENT)
Dept: HEALTH INFORMATION MANAGEMENT | Facility: CLINIC | Age: 85
End: 2020-10-27

## 2020-12-12 ENCOUNTER — HEALTH MAINTENANCE LETTER (OUTPATIENT)
Age: 85
End: 2020-12-12

## 2021-01-15 ENCOUNTER — HEALTH MAINTENANCE LETTER (OUTPATIENT)
Age: 86
End: 2021-01-15

## 2021-02-12 ENCOUNTER — TRANSFERRED RECORDS (OUTPATIENT)
Dept: HEALTH INFORMATION MANAGEMENT | Facility: CLINIC | Age: 86
End: 2021-02-12

## 2021-09-26 ENCOUNTER — HEALTH MAINTENANCE LETTER (OUTPATIENT)
Age: 86
End: 2021-09-26

## 2022-01-16 ENCOUNTER — HEALTH MAINTENANCE LETTER (OUTPATIENT)
Age: 87
End: 2022-01-16

## 2023-01-08 ENCOUNTER — HEALTH MAINTENANCE LETTER (OUTPATIENT)
Age: 88
End: 2023-01-08

## 2023-04-23 ENCOUNTER — HEALTH MAINTENANCE LETTER (OUTPATIENT)
Age: 88
End: 2023-04-23